# Patient Record
Sex: MALE | Race: WHITE | NOT HISPANIC OR LATINO | URBAN - METROPOLITAN AREA
[De-identification: names, ages, dates, MRNs, and addresses within clinical notes are randomized per-mention and may not be internally consistent; named-entity substitution may affect disease eponyms.]

---

## 2023-08-09 ENCOUNTER — NURSING HOME VISIT (OUTPATIENT)
Dept: POST ACUTE CARE | Facility: EXTERNAL LOCATION | Age: 58
End: 2023-08-09
Payer: COMMERCIAL

## 2023-08-09 DIAGNOSIS — M86.00 ACUTE HEMATOGENOUS OSTEOMYELITIS, UNSPECIFIED SITE (MULTI): ICD-10-CM

## 2023-08-09 DIAGNOSIS — S82.201A CLOSED FRACTURE OF RIGHT TIBIA AND FIBULA, INITIAL ENCOUNTER: Primary | ICD-10-CM

## 2023-08-09 DIAGNOSIS — G47.33 OBSTRUCTIVE SLEEP APNEA: ICD-10-CM

## 2023-08-09 DIAGNOSIS — S82.401A CLOSED FRACTURE OF RIGHT TIBIA AND FIBULA, INITIAL ENCOUNTER: Primary | ICD-10-CM

## 2023-08-09 PROCEDURE — 99305 1ST NF CARE MODERATE MDM 35: CPT | Performed by: INTERNAL MEDICINE

## 2023-08-09 NOTE — LETTER
Patient: John Rojas  : 1965    Encounter Date: 2023    HISTORY & PHYSICAL    Subjective  Chief complaint: John Rojas is a 57 y.o. male who is a acute skilled care patient being seen and evaluated for multiple medical problems.  Patient presents for weakness.    HPI:  Patient was recently admitted to skilled nursing facility. Patient was in the hospital after a fall. Patient has difficulty walking and fell, resulting in a fracture of right tibia. Patient was evaluated by PT and OT and discharged to skilled nursing facility. Patient is going back for surgery next week        Past Medical History:   Diagnosis Date   • Obstructive sleep apnea (adult) (pediatric)     Obstructive sleep apnea   • Pain in unspecified hip     Joint pain, hip   • Pain in unspecified knee     Joint pain, knee       Past Surgical History:   Procedure Laterality Date   • CT HEAD ANGIO W AND WO IV CONTRAST  2020    CT HEAD ANGIO W AND WO IV CONTRAST 2020 [facility] CLINICAL LEGACY   • CT NECK ANGIO W AND WO IV CONTRAST  2020    CT NECK ANGIO W AND WO IV CONTRAST 2020 [facility] CLINICAL LEGACY   • TOTAL HIP ARTHROPLASTY  10/18/2013    Total Hip Replacement       No family history on file.    Social History     Socioeconomic History   • Marital status:      Spouse name: Not on file   • Number of children: Not on file   • Years of education: Not on file   • Highest education level: Not on file   Occupational History   • Not on file   Tobacco Use   • Smoking status: Not on file   • Smokeless tobacco: Not on file   Substance and Sexual Activity   • Alcohol use: Not on file   • Drug use: Not on file   • Sexual activity: Not on file   Other Topics Concern   • Not on file   Social History Narrative   • Not on file     Social Determinants of Health     Financial Resource Strain: Not on file   Food Insecurity: Not on file   Transportation Needs: Not on file   Physical Activity: Not on file   Stress: Not on file    Social Connections: Not on file   Intimate Partner Violence: Not on file   Housing Stability: Not on file       Vital signs:110/70    Objective  Physical Exam  Vitals reviewed.   Constitutional:       Appearance: Normal appearance.   HENT:      Head: Normocephalic and atraumatic.   Cardiovascular:      Rate and Rhythm: Normal rate and regular rhythm.   Pulmonary:      Effort: Pulmonary effort is normal.      Breath sounds: Normal breath sounds.   Abdominal:      General: Bowel sounds are normal.      Palpations: Abdomen is soft.   Musculoskeletal:      Cervical back: Neck supple.      Comments: Right leg in the immobilizer  Left arm from of the elbow to Forearm is in tIn the flexible splint   Skin:     General: Skin is warm and dry.   Neurological:      General: No focal deficit present.      Mental Status: He is alert.   Psychiatric:         Mood and Affect: Mood normal.         Behavior: Behavior is cooperative.         Assessment/Plan  Problem List Items Addressed This Visit       Obstructive sleep apnea    Acute hematogenous osteomyelitis (CMS/HCC)    Closed fracture of right fibula and tibia - Primary     Medications, treatments, and labs reviewed  Continue medications and treatments as listed in [facility]    Scribe Attestation  IGinny Scribe   attest that this documentation has been prepared under the direction and in the presence of Lilia Steve MD.    Provider Attestation - Scribe documentation  All medical record entries made by the Scribe were at my direction and personally dictated by me. I have reviewed the chart and agree that the record accurately reflects my personal performance of the history, physical exam, discussion and plan.    Lilia Steve MD          Electronically Signed By: Lilia Steve MD   8/9/23  6:58 PM

## 2023-08-09 NOTE — PROGRESS NOTES
HISTORY & PHYSICAL    Subjective   Chief complaint: John Rojas is a 57 y.o. male who is a acute skilled care patient being seen and evaluated for multiple medical problems.  Patient presents for weakness.    HPI:  Patient was recently admitted to skilled nursing facility. Patient was in the hospital after a fall. Patient has difficulty walking and fell, resulting in a fracture of right tibia. Patient was evaluated by PT and OT and discharged to skilled nursing facility. Patient is going back for surgery next week        Past Medical History:   Diagnosis Date    Obstructive sleep apnea (adult) (pediatric)     Obstructive sleep apnea    Pain in unspecified hip     Joint pain, hip    Pain in unspecified knee     Joint pain, knee       Past Surgical History:   Procedure Laterality Date    CT HEAD ANGIO W AND WO IV CONTRAST  8/22/2020    CT HEAD ANGIO W AND WO IV CONTRAST 8/22/2020 [facility] CLINICAL LEGACY    CT NECK ANGIO W AND WO IV CONTRAST  8/22/2020    CT NECK ANGIO W AND WO IV CONTRAST 8/22/2020 [facility] CLINICAL LEGACY    TOTAL HIP ARTHROPLASTY  10/18/2013    Total Hip Replacement       No family history on file.    Social History     Socioeconomic History    Marital status:      Spouse name: Not on file    Number of children: Not on file    Years of education: Not on file    Highest education level: Not on file   Occupational History    Not on file   Tobacco Use    Smoking status: Not on file    Smokeless tobacco: Not on file   Substance and Sexual Activity    Alcohol use: Not on file    Drug use: Not on file    Sexual activity: Not on file   Other Topics Concern    Not on file   Social History Narrative    Not on file     Social Determinants of Health     Financial Resource Strain: Not on file   Food Insecurity: Not on file   Transportation Needs: Not on file   Physical Activity: Not on file   Stress: Not on file   Social Connections: Not on file   Intimate Partner Violence: Not on file   Housing Stability:  Not on file       Vital signs:110/70    Objective   Physical Exam  Vitals reviewed.   Constitutional:       Appearance: Normal appearance.   HENT:      Head: Normocephalic and atraumatic.   Cardiovascular:      Rate and Rhythm: Normal rate and regular rhythm.   Pulmonary:      Effort: Pulmonary effort is normal.      Breath sounds: Normal breath sounds.   Abdominal:      General: Bowel sounds are normal.      Palpations: Abdomen is soft.   Musculoskeletal:      Cervical back: Neck supple.      Comments: Right leg in the immobilizer  Left arm from of the elbow to Forearm is in tIn the flexible splint   Skin:     General: Skin is warm and dry.   Neurological:      General: No focal deficit present.      Mental Status: He is alert.   Psychiatric:         Mood and Affect: Mood normal.         Behavior: Behavior is cooperative.         Assessment/Plan   Problem List Items Addressed This Visit       Obstructive sleep apnea    Acute hematogenous osteomyelitis (CMS/HCC)    Closed fracture of right fibula and tibia - Primary     Medications, treatments, and labs reviewed  Continue medications and treatments as listed in [facility]    Scribe Attestation  I, Ginny Arriaga Scribe   attest that this documentation has been prepared under the direction and in the presence of Lilia Steve MD.    Provider Attestation - Scribe documentation  All medical record entries made by the Scribe were at my direction and personally dictated by me. I have reviewed the chart and agree that the record accurately reflects my personal performance of the history, physical exam, discussion and plan.    Lilia Steve MD

## 2023-08-11 ENCOUNTER — NURSING HOME VISIT (OUTPATIENT)
Dept: POST ACUTE CARE | Facility: EXTERNAL LOCATION | Age: 58
End: 2023-08-11
Payer: COMMERCIAL

## 2023-08-11 DIAGNOSIS — R53.1 WEAKNESS: ICD-10-CM

## 2023-08-11 DIAGNOSIS — S82.201D CLOSED FRACTURE OF RIGHT TIBIA AND FIBULA WITH ROUTINE HEALING, SUBSEQUENT ENCOUNTER: Primary | ICD-10-CM

## 2023-08-11 DIAGNOSIS — S82.401D CLOSED FRACTURE OF RIGHT TIBIA AND FIBULA WITH ROUTINE HEALING, SUBSEQUENT ENCOUNTER: Primary | ICD-10-CM

## 2023-08-11 DIAGNOSIS — M86.00 ACUTE HEMATOGENOUS OSTEOMYELITIS, UNSPECIFIED SITE (MULTI): ICD-10-CM

## 2023-08-11 PROCEDURE — 99308 SBSQ NF CARE LOW MDM 20: CPT | Performed by: INTERNAL MEDICINE

## 2023-08-11 NOTE — LETTER
Patient: John Rojas  : 1965    Encounter Date: 2023    PROGRESS NOTE    Subjective  Chief complaint: John Rojas is a 57 y.o. male who is an acute skilled patient being seen and evaluated for weakness    HPI:  HPI  Objective  Vital signs: 126/74, 98%    Physical Exam  Constitutional:       General: He is not in acute distress.  Eyes:      Extraocular Movements: Extraocular movements intact.   Cardiovascular:      Rate and Rhythm: Normal rate and regular rhythm.   Pulmonary:      Effort: Pulmonary effort is normal.      Breath sounds: Normal breath sounds.   Abdominal:      General: Bowel sounds are normal.      Palpations: Abdomen is soft.   Musculoskeletal:      Cervical back: Neck supple.      Right lower leg: No edema.      Left lower leg: No edema.      Comments: Left arm immobilizer  Right knee immobilizer   Neurological:      Mental Status: He is alert.   Psychiatric:         Mood and Affect: Mood normal.         Behavior: Behavior is cooperative.         Assessment/Plan  Problem List Items Addressed This Visit       Acute hematogenous osteomyelitis (CMS/HCC)     Left elbow  Immobilizer in place  Cont current meds         Closed fracture of right fibula and tibia - Primary     Therapy  Follow up with Ortho         Weakness     Cont therapy          Medications, treatments, and labs reviewed  Continue medications and treatments as listed in [facility]    Scribe Attestation  By signing my name below, IRenata Scribperla   attest that this documentation has been prepared under the direction and in the presence of Lilia Steve MD.    Provider Attestation - Scribe documentation  All medical record entries made by the Scribe were at my direction and personally dictated by me. I have reviewed the chart and agree that the record accurately reflects my personal performance of the history, physical exam, discussion and plan.      Electronically Signed By: Lilia Steve MD   23  3:53 PM

## 2023-08-11 NOTE — PROGRESS NOTES
PROGRESS NOTE    Subjective   Chief complaint: John Rojas is a 57 y.o. male who is an acute skilled patient being seen and evaluated for weakness    HPI:  HPI  Objective   Vital signs: 126/74, 98%    Physical Exam  Constitutional:       General: He is not in acute distress.  Eyes:      Extraocular Movements: Extraocular movements intact.   Cardiovascular:      Rate and Rhythm: Normal rate and regular rhythm.   Pulmonary:      Effort: Pulmonary effort is normal.      Breath sounds: Normal breath sounds.   Abdominal:      General: Bowel sounds are normal.      Palpations: Abdomen is soft.   Musculoskeletal:      Cervical back: Neck supple.      Right lower leg: No edema.      Left lower leg: No edema.      Comments: Left arm immobilizer  Right knee immobilizer   Neurological:      Mental Status: He is alert.   Psychiatric:         Mood and Affect: Mood normal.         Behavior: Behavior is cooperative.         Assessment/Plan   Problem List Items Addressed This Visit       Acute hematogenous osteomyelitis (CMS/HCC)     Left elbow  Immobilizer in place  Cont current meds         Closed fracture of right fibula and tibia - Primary     Therapy  Follow up with Ortho         Weakness     Cont therapy          Medications, treatments, and labs reviewed  Continue medications and treatments as listed in [facility]    Scribe Attestation  By signing my name below, I, Irene Salcedoibperla   attest that this documentation has been prepared under the direction and in the presence of Lilia Steve MD.    Provider Attestation - Scribe documentation  All medical record entries made by the Scribe were at my direction and personally dictated by me. I have reviewed the chart and agree that the record accurately reflects my personal performance of the history, physical exam, discussion and plan.

## 2023-08-16 ENCOUNTER — NURSING HOME VISIT (OUTPATIENT)
Dept: POST ACUTE CARE | Facility: EXTERNAL LOCATION | Age: 58
End: 2023-08-16
Payer: COMMERCIAL

## 2023-08-16 DIAGNOSIS — M86.00 ACUTE HEMATOGENOUS OSTEOMYELITIS, UNSPECIFIED SITE (MULTI): ICD-10-CM

## 2023-08-16 DIAGNOSIS — R53.1 WEAKNESS: Primary | ICD-10-CM

## 2023-08-16 DIAGNOSIS — S82.201D CLOSED FRACTURE OF RIGHT TIBIA AND FIBULA WITH ROUTINE HEALING, SUBSEQUENT ENCOUNTER: ICD-10-CM

## 2023-08-16 DIAGNOSIS — S82.401D CLOSED FRACTURE OF RIGHT TIBIA AND FIBULA WITH ROUTINE HEALING, SUBSEQUENT ENCOUNTER: ICD-10-CM

## 2023-08-16 PROCEDURE — 99308 SBSQ NF CARE LOW MDM 20: CPT | Performed by: INTERNAL MEDICINE

## 2023-08-16 NOTE — LETTER
Patient: John Rojas  : 1965    Encounter Date: 2023    PROGRESS NOTE    Subjective  Chief complaint: John Rojas is a 57 y.o. male who is an acute skilled patient being seen and evaluated for weakness    HPI:   patient seen and evaluated for weakness.  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   Patient has a follow-up with Ortho today,   Where he will address his pain which she feels is not controlled.       Objective  Vital signs: 126/74, 98%    Physical Exam  Constitutional:       General: He is not in acute distress.  Eyes:      Extraocular Movements: Extraocular movements intact.   Cardiovascular:      Rate and Rhythm: Normal rate and regular rhythm.   Pulmonary:      Effort: Pulmonary effort is normal.      Breath sounds: Normal breath sounds.   Abdominal:      General: Bowel sounds are normal.      Palpations: Abdomen is soft.   Musculoskeletal:      Cervical back: Neck supple.      Right lower leg: No edema.      Left lower leg: No edema.      Comments: Left arm immobilizer  Right knee immobilizer   Neurological:      Mental Status: He is alert.   Psychiatric:         Mood and Affect: Mood normal.         Behavior: Behavior is cooperative.         Assessment/Plan  Problem List Items Addressed This Visit       Acute hematogenous osteomyelitis (CMS/HCC)     Left elbow  Immobilizer in place  Cont current meds         Closed fracture of right fibula and tibia     Therapy  Follow up with Ortho today         Weakness - Primary     Cont therapy        Medications, treatments, and labs reviewed  Continue medications and treatments as listed in [facility]    Scribe Attestation  By signing my name below, IRenata, Ireneibperla   attest that this documentation has been prepared under the direction and in the presence of Lilia Steve MD.    Provider Attestation - Scribe documentation  All medical record entries made by  the Scribe were at my direction and personally dictated by me. I have reviewed the chart and agree that the record accurately reflects my personal performance of the history, physical exam, discussion and plan.      Electronically Signed By: Lilia Steve MD   8/16/23  1:27 PM

## 2023-08-16 NOTE — PROGRESS NOTES
PROGRESS NOTE    Subjective   Chief complaint: John Rojas is a 57 y.o. male who is an acute skilled patient being seen and evaluated for weakness    HPI:   patient seen and evaluated for weakness.  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   Patient has a follow-up with Ortho today,   Where he will address his pain which she feels is not controlled.       Objective   Vital signs: 126/74, 98%    Physical Exam  Constitutional:       General: He is not in acute distress.  Eyes:      Extraocular Movements: Extraocular movements intact.   Cardiovascular:      Rate and Rhythm: Normal rate and regular rhythm.   Pulmonary:      Effort: Pulmonary effort is normal.      Breath sounds: Normal breath sounds.   Abdominal:      General: Bowel sounds are normal.      Palpations: Abdomen is soft.   Musculoskeletal:      Cervical back: Neck supple.      Right lower leg: No edema.      Left lower leg: No edema.      Comments: Left arm immobilizer  Right knee immobilizer   Neurological:      Mental Status: He is alert.   Psychiatric:         Mood and Affect: Mood normal.         Behavior: Behavior is cooperative.         Assessment/Plan   Problem List Items Addressed This Visit       Acute hematogenous osteomyelitis (CMS/HCC)     Left elbow  Immobilizer in place  Cont current meds         Closed fracture of right fibula and tibia     Therapy  Follow up with Ortho today         Weakness - Primary     Cont therapy        Medications, treatments, and labs reviewed  Continue medications and treatments as listed in [facility]    Scribe Attestation  By signing my name below, I, Trisha Salcedo   attest that this documentation has been prepared under the direction and in the presence of Lilia Steve MD.    Provider Attestation - Scribe documentation  All medical record entries made by the Scribe were at my direction and personally dictated by me. I have  reviewed the chart and agree that the record accurately reflects my personal performance of the history, physical exam, discussion and plan.

## 2023-08-18 ENCOUNTER — NURSING HOME VISIT (OUTPATIENT)
Dept: POST ACUTE CARE | Facility: EXTERNAL LOCATION | Age: 58
End: 2023-08-18

## 2023-08-18 ENCOUNTER — NURSING HOME VISIT (OUTPATIENT)
Dept: POST ACUTE CARE | Facility: EXTERNAL LOCATION | Age: 58
End: 2023-08-18
Payer: COMMERCIAL

## 2023-08-18 DIAGNOSIS — I48.91 ATRIAL FIBRILLATION, UNSPECIFIED TYPE (MULTI): Primary | ICD-10-CM

## 2023-08-18 DIAGNOSIS — S82.201D CLOSED FRACTURE OF RIGHT TIBIA AND FIBULA WITH ROUTINE HEALING, SUBSEQUENT ENCOUNTER: Primary | ICD-10-CM

## 2023-08-18 DIAGNOSIS — R53.1 WEAKNESS: ICD-10-CM

## 2023-08-18 DIAGNOSIS — S82.401D CLOSED FRACTURE OF RIGHT TIBIA AND FIBULA WITH ROUTINE HEALING, SUBSEQUENT ENCOUNTER: Primary | ICD-10-CM

## 2023-08-18 DIAGNOSIS — M86.00 ACUTE HEMATOGENOUS OSTEOMYELITIS, UNSPECIFIED SITE (MULTI): ICD-10-CM

## 2023-08-18 PROCEDURE — 99308 SBSQ NF CARE LOW MDM 20: CPT | Performed by: INTERNAL MEDICINE

## 2023-08-18 NOTE — PROGRESS NOTES
PROGRESS NOTE    Subjective   Chief complaint: John Rojas is a 57 y.o. male who is a long term care patient being seen and evaluated for Afib    HPI:  I was asked to see patient and evaluate current usage of AC, Xarelto. She has hx of Afib and has been taking Xarelto 15mg daily. Denies palpitations and chest pain. No acute distress.       Objective   Vital signs: 133/74, 98%    Physical Exam  Constitutional:       General: He is not in acute distress.     Appearance: He is obese.   Eyes:      Extraocular Movements: Extraocular movements intact.   Cardiovascular:      Rate and Rhythm: Normal rate and regular rhythm.   Pulmonary:      Effort: Pulmonary effort is normal.      Breath sounds: Normal breath sounds.   Abdominal:      General: Bowel sounds are normal.      Palpations: Abdomen is soft.   Musculoskeletal:      Cervical back: Neck supple.      Right lower leg: Edema present.      Left lower leg: Edema present.   Skin:     General: Skin is warm and dry.   Neurological:      Mental Status: He is alert.   Psychiatric:         Mood and Affect: Mood normal.         Behavior: Behavior is cooperative.         Assessment/Plan   Problem List Items Addressed This Visit       A-fib (CMS/HCC) - Primary     Increase Xarelto to 20mg daily per pharmacy reccomendation          Medications, treatments, and labs reviewed  Continue medications and treatments as listed in [facility]    Scribe Attestation  By signing my name below, I, Trisha Salcedo   attest that this documentation has been prepared under the direction and in the presence of Lilia Steve MD.    Provider Attestation - Scribe documentation  All medical record entries made by the Scribe were at my direction and personally dictated by me. I have reviewed the chart and agree that the record accurately reflects my personal performance of the history, physical exam, discussion and plan.    1. Atrial fibrillation, unspecified type (CMS/HCC)

## 2023-08-18 NOTE — PROGRESS NOTES
PROGRESS NOTE    Subjective   Chief complaint: Jhon Rojas is a 57 y.o. male who is an acute skilled patient being seen and evaluated for weakness    HPI:   patient seen and evaluated for weakness.  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  Patient had a follow-up with Ortho this week and stabilizer was removed from his knee.       Objective   Vital signs: 126/74, 98%    Physical Exam  Constitutional:       General: He is not in acute distress.  Eyes:      Extraocular Movements: Extraocular movements intact.   Cardiovascular:      Rate and Rhythm: Normal rate and regular rhythm.   Pulmonary:      Effort: Pulmonary effort is normal.      Breath sounds: Normal breath sounds.   Abdominal:      General: Bowel sounds are normal.      Palpations: Abdomen is soft.   Musculoskeletal:      Cervical back: Neck supple.      Right lower leg: No edema.      Left lower leg: No edema.      Comments: Left arm immobilizer  Right knee immobilizer- discontinued   Neurological:      Mental Status: He is alert.   Psychiatric:         Mood and Affect: Mood normal.         Behavior: Behavior is cooperative.         Assessment/Plan   Problem List Items Addressed This Visit    None  Medications, treatments, and labs reviewed  Continue medications and treatments as listed in [facility]    Scribe Attestation  By signing my name below, IRenata Scribe   attest that this documentation has been prepared under the direction and in the presence of Lilia Steve MD.    Provider Attestation - Scribe documentation  All medical record entries made by the Scribe were at my direction and personally dictated by me. I have reviewed the chart and agree that the record accurately reflects my personal performance of the history, physical exam, discussion and plan.  1. Closed fracture of right tibia and fibula with routine healing, subsequent encounter        2. Acute  hematogenous osteomyelitis, unspecified site (CMS/HCC)        3. Weakness

## 2023-08-18 NOTE — LETTER
Patient: John Rojas  : 1965    Encounter Date: 2023    PROGRESS NOTE    Subjective  Chief complaint: John Rojas is a 57 y.o. male who is an acute skilled patient being seen and evaluated for weakness    HPI:   patient seen and evaluated for weakness.  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  Patient had a follow-up with Ortho this week and stabilizer was removed from his knee.       Objective  Vital signs: 126/74, 98%    Physical Exam  Constitutional:       General: He is not in acute distress.  Eyes:      Extraocular Movements: Extraocular movements intact.   Cardiovascular:      Rate and Rhythm: Normal rate and regular rhythm.   Pulmonary:      Effort: Pulmonary effort is normal.      Breath sounds: Normal breath sounds.   Abdominal:      General: Bowel sounds are normal.      Palpations: Abdomen is soft.   Musculoskeletal:      Cervical back: Neck supple.      Right lower leg: No edema.      Left lower leg: No edema.      Comments: Left arm immobilizer  Right knee immobilizer- discontinued   Neurological:      Mental Status: He is alert.   Psychiatric:         Mood and Affect: Mood normal.         Behavior: Behavior is cooperative.         Assessment/Plan  Problem List Items Addressed This Visit    None  Medications, treatments, and labs reviewed  Continue medications and treatments as listed in [facility]    Scribe Attestation  By signing my name below, I, Trisha Salcedo   attest that this documentation has been prepared under the direction and in the presence of Lilia Steve MD.    Provider Attestation - Scribe documentation  All medical record entries made by the Scribe were at my direction and personally dictated by me. I have reviewed the chart and agree that the record accurately reflects my personal performance of the history, physical exam, discussion and plan.  1. Closed fracture of right tibia and fibula  with routine healing, subsequent encounter        2. Acute hematogenous osteomyelitis, unspecified site (CMS/HCC)        3. Weakness              Electronically Signed By: Lilia Steve MD   8/19/23  2:32 PM

## 2023-08-18 NOTE — LETTER
Patient: John Rojas  : 1965    Encounter Date: 2023    PROGRESS NOTE    Subjective  Chief complaint: John Rojas is a 57 y.o. male who is a long term care patient being seen and evaluated for Afib    HPI:  I was asked to see patient and evaluate current usage of AC, Xarelto. She has hx of Afib and has been taking Xarelto 15mg daily. Denies palpitations and chest pain. No acute distress.       Objective  Vital signs: 133/74, 98%    Physical Exam  Constitutional:       General: He is not in acute distress.     Appearance: He is obese.   Eyes:      Extraocular Movements: Extraocular movements intact.   Cardiovascular:      Rate and Rhythm: Normal rate and regular rhythm.   Pulmonary:      Effort: Pulmonary effort is normal.      Breath sounds: Normal breath sounds.   Abdominal:      General: Bowel sounds are normal.      Palpations: Abdomen is soft.   Musculoskeletal:      Cervical back: Neck supple.      Right lower leg: Edema present.      Left lower leg: Edema present.   Skin:     General: Skin is warm and dry.   Neurological:      Mental Status: He is alert.   Psychiatric:         Mood and Affect: Mood normal.         Behavior: Behavior is cooperative.         Assessment/Plan  Problem List Items Addressed This Visit       A-fib (CMS/[facility]) - Primary     Increase Xarelto to 20mg daily per pharmacy reccomendation          Medications, treatments, and labs reviewed  Continue medications and treatments as listed in [facility]    Scribe Attestation  By signing my name below, IRenata Scribe   attest that this documentation has been prepared under the direction and in the presence of Lilia Steve MD.    Provider Attestation - Scribe documentation  All medical record entries made by the Scribe were at my direction and personally dictated by me. I have reviewed the chart and agree that the record accurately reflects my personal performance of the history, physical exam, discussion and plan.    1.  Atrial fibrillation, unspecified type (CMS/HCC)               Electronically Signed By: Lilia Steve MD   8/19/23  2:36 PM

## 2023-08-23 ENCOUNTER — NURSING HOME VISIT (OUTPATIENT)
Dept: POST ACUTE CARE | Facility: EXTERNAL LOCATION | Age: 58
End: 2023-08-23
Payer: COMMERCIAL

## 2023-08-23 DIAGNOSIS — S82.401D CLOSED FRACTURE OF RIGHT TIBIA AND FIBULA WITH ROUTINE HEALING, SUBSEQUENT ENCOUNTER: Primary | ICD-10-CM

## 2023-08-23 DIAGNOSIS — S82.201D CLOSED FRACTURE OF RIGHT TIBIA AND FIBULA WITH ROUTINE HEALING, SUBSEQUENT ENCOUNTER: Primary | ICD-10-CM

## 2023-08-23 DIAGNOSIS — R53.1 WEAKNESS: ICD-10-CM

## 2023-08-23 PROCEDURE — 99308 SBSQ NF CARE LOW MDM 20: CPT | Performed by: INTERNAL MEDICINE

## 2023-08-23 NOTE — LETTER
Patient: John Rojas  : 1965    Encounter Date: 2023    PROGRESS NOTE    Subjective  Chief complaint: John Rojas is a 57 y.o. male who is an acute skilled patient being seen and evaluated for weakness    HPI:  Patient working in therapy due to weakness and debility. Requires assistance for transfers, ADL's and mobility. Pain from recent fracture controlled. No acute distress.       Objective  Vital signs: 124/76, 98%    Physical Exam  Constitutional:       General: He is not in acute distress.  Eyes:      Extraocular Movements: Extraocular movements intact.   Cardiovascular:      Rate and Rhythm: Normal rate and regular rhythm.   Pulmonary:      Effort: Pulmonary effort is normal.      Breath sounds: Normal breath sounds.   Abdominal:      General: Bowel sounds are normal.      Palpations: Abdomen is soft.   Musculoskeletal:      Cervical back: Neck supple.      Right lower leg: No edema.      Left lower leg: No edema.   Neurological:      Mental Status: He is alert.   Psychiatric:         Mood and Affect: Mood normal.         Behavior: Behavior is cooperative.         Assessment/Plan  Problem List Items Addressed This Visit       Closed fracture of right fibula and tibia - Primary     Stabilizer removed  Continue therapy           Weakness     Cont therapy          Medications, treatments, and labs reviewed  Continue medications and treatments as listed in [facility]    Scribe Attestation  By signing my name below, I, Irene Salcedoibperla   attest that this documentation has been prepared under the direction and in the presence of Lilia Steve MD.    Provider Attestation - Scribe documentation  All medical record entries made by the Scribe were at my direction and personally dictated by me. I have reviewed the chart and agree that the record accurately reflects my personal performance of the history, physical exam, discussion and plan.      Electronically Signed By: Lilia Steve MD   23   6:17 PM

## 2023-08-25 ENCOUNTER — NURSING HOME VISIT (OUTPATIENT)
Dept: POST ACUTE CARE | Facility: EXTERNAL LOCATION | Age: 58
End: 2023-08-25
Payer: COMMERCIAL

## 2023-08-25 DIAGNOSIS — S82.401D CLOSED FRACTURE OF RIGHT TIBIA AND FIBULA WITH ROUTINE HEALING, SUBSEQUENT ENCOUNTER: Primary | ICD-10-CM

## 2023-08-25 DIAGNOSIS — R53.1 WEAKNESS: ICD-10-CM

## 2023-08-25 DIAGNOSIS — I48.91 ATRIAL FIBRILLATION, UNSPECIFIED TYPE (MULTI): ICD-10-CM

## 2023-08-25 DIAGNOSIS — S82.201D CLOSED FRACTURE OF RIGHT TIBIA AND FIBULA WITH ROUTINE HEALING, SUBSEQUENT ENCOUNTER: Primary | ICD-10-CM

## 2023-08-25 PROCEDURE — 99308 SBSQ NF CARE LOW MDM 20: CPT | Performed by: INTERNAL MEDICINE

## 2023-08-25 NOTE — LETTER
Patient: John Rojas  : 1965    Encounter Date: 2023    PROGRESS NOTE    Subjective  Chief complaint: John Rojas is a 57 y.o. male who is a long term care patient being seen and evaluated for Afib    HPI:  Patient continues to work in therapy due to weakness and debility. Requires assistance with transfers, ADL's and mobility. Denies palpitations and chest pain. No acute distress. Pain from recent fracture controlled.       Objective  Vital signs: 129/72,  98%    Physical Exam  Constitutional:       General: He is not in acute distress.     Appearance: He is obese.   Eyes:      Extraocular Movements: Extraocular movements intact.   Cardiovascular:      Rate and Rhythm: Normal rate and regular rhythm.   Pulmonary:      Effort: Pulmonary effort is normal.      Breath sounds: Normal breath sounds.   Abdominal:      General: Bowel sounds are normal.      Palpations: Abdomen is soft.   Musculoskeletal:      Cervical back: Neck supple.      Right lower leg: Edema present.      Left lower leg: Edema present.   Skin:     General: Skin is warm and dry.   Neurological:      Mental Status: He is alert.   Psychiatric:         Mood and Affect: Mood normal.         Behavior: Behavior is cooperative.         Assessment/Plan  Problem List Items Addressed This Visit       Closed fracture of right fibula and tibia - Primary     Stabilizer removed  Continue therapy           Weakness     Cont therapy         A-fib (CMS/[facility])     Continue Xarelto   Monitor for bleeding         Medications, treatments, and labs reviewed  Continue medications and treatments as listed in [facility]    Scribe Attestation  By signing my name below, Renata STEWART Scribe   attest that this documentation has been prepared under the direction and in the presence of Liila Steve MD.    Provider Attestation - Scribe documentation  All medical record entries made by the Scribe were at my direction and personally dictated by me. I have  reviewed the chart and agree that the record accurately reflects my personal performance of the history, physical exam, discussion and plan.    1. Closed fracture of right tibia and fibula with routine healing, subsequent encounter        2. Weakness        3. Atrial fibrillation, unspecified type (CMS/[facility])                 Electronically Signed By: Lilia Steve MD   8/25/23  6:03 PM

## 2023-08-25 NOTE — PROGRESS NOTES
PROGRESS NOTE    Subjective   Chief complaint: John Rojas is a 57 y.o. male who is a long term care patient being seen and evaluated for Afib    HPI:  Patient continues to work in therapy due to weakness and debility. Requires assistance with transfers, ADL's and mobility. Denies palpitations and chest pain. No acute distress. Pain from recent fracture controlled.       Objective   Vital signs: 129/72,  98%    Physical Exam  Constitutional:       General: He is not in acute distress.     Appearance: He is obese.   Eyes:      Extraocular Movements: Extraocular movements intact.   Cardiovascular:      Rate and Rhythm: Normal rate and regular rhythm.   Pulmonary:      Effort: Pulmonary effort is normal.      Breath sounds: Normal breath sounds.   Abdominal:      General: Bowel sounds are normal.      Palpations: Abdomen is soft.   Musculoskeletal:      Cervical back: Neck supple.      Right lower leg: Edema present.      Left lower leg: Edema present.   Skin:     General: Skin is warm and dry.   Neurological:      Mental Status: He is alert.   Psychiatric:         Mood and Affect: Mood normal.         Behavior: Behavior is cooperative.         Assessment/Plan   Problem List Items Addressed This Visit       Closed fracture of right fibula and tibia - Primary     Stabilizer removed  Continue therapy           Weakness     Cont therapy         A-fib (CMS/[facility])     Continue Xarelto   Monitor for bleeding         Medications, treatments, and labs reviewed  Continue medications and treatments as listed in [facility]    Scribe Attestation  By signing my name below, I, Trisha Salcedo   attest that this documentation has been prepared under the direction and in the presence of Lilia Steve MD.    Provider Attestation - Scribe documentation  All medical record entries made by the Scribe were at my direction and personally dictated by me. I have reviewed the chart and agree that the record accurately reflects my personal  performance of the history, physical exam, discussion and plan.    1. Closed fracture of right tibia and fibula with routine healing, subsequent encounter        2. Weakness        3. Atrial fibrillation, unspecified type (CMS/HCC)

## 2023-08-28 ENCOUNTER — NURSING HOME VISIT (OUTPATIENT)
Dept: POST ACUTE CARE | Facility: EXTERNAL LOCATION | Age: 58
End: 2023-08-28
Payer: COMMERCIAL

## 2023-08-28 DIAGNOSIS — R42 DIZZINESS: ICD-10-CM

## 2023-08-28 DIAGNOSIS — I48.91 ATRIAL FIBRILLATION, UNSPECIFIED TYPE (MULTI): Primary | ICD-10-CM

## 2023-08-28 DIAGNOSIS — R53.1 WEAKNESS: ICD-10-CM

## 2023-08-28 PROCEDURE — 99309 SBSQ NF CARE MODERATE MDM 30: CPT

## 2023-08-28 NOTE — LETTER
Patient: John Rojas  : 1965    Encounter Date: 2023    PROGRESS NOTE    Subjective  Chief complaint: John Rojas is a 57 y.o. male who is an acute skilled patient being seen and evaluated for weakness    HPI:  Patient seen and evaluated for general medical care and routine follow-up.  Patient continues to work with therapy for stretching, weightbearing, strengthening and balance.  Continues to use brace on left upper extremity.  Patient has scheduled L UE amputation D/T osteomyelitis on 10/19.  Patient had a recent fall D/T dizziness.  Patient also states being unable to bear weight in PT D/T right knee popping  With resultant weakness.  No other concerns per nursing          Objective  Vital signs:  111/89-68-18    Physical Exam  Constitutional:       Appearance: Normal appearance.   HENT:      Head: Normocephalic.      Mouth/Throat:      Mouth: Mucous membranes are moist.   Eyes:      Extraocular Movements: Extraocular movements intact.   Cardiovascular:      Rate and Rhythm: Normal rate. Rhythm irregular.      Pulses: Normal pulses.      Heart sounds: Normal heart sounds.   Pulmonary:      Effort: Pulmonary effort is normal.      Breath sounds: Normal breath sounds.   Abdominal:      General: Bowel sounds are normal.      Palpations: Abdomen is soft.   Musculoskeletal:         General: Normal range of motion.      Cervical back: Normal range of motion and neck supple.      Comments:  right knee- unable to bear weight S/P popping   no redness, warmth, tenderness, swelling    MSPs intact   L UE brace   Skin:     General: Skin is warm and dry.      Capillary Refill: Capillary refill takes less than 2 seconds.   Neurological:      Mental Status: He is alert. Mental status is at baseline.   Psychiatric:         Mood and Affect: Mood normal.         Behavior: Behavior normal.         Assessment/Plan  Problem List Items Addressed This Visit       Weakness      therapy         A-fib (CMS/[facility]) -  Primary      Stable   no SOB, palpitations, chest pain  (+) dizziness   Xarelto   bleeding precautions         Dizziness     ongoing history of vertigo   Meclizine   reinforced calling for assistance prior to getting up          Medications, treatments, and labs reviewed  Continue medications and treatments as listed in EMR    SIMON La        Electronically Signed By: SIMON La   9/9/23  5:27 PM

## 2023-08-29 NOTE — PROGRESS NOTES
PROGRESS NOTE    Subjective   Chief complaint: John Rojas is a 57 y.o. male who is an acute skilled patient being seen and evaluated for weakness    HPI:  Patient working in therapy due to weakness and debility. Requires assistance for transfers, ADL's and mobility. Pain from recent fracture controlled. No acute distress.       Objective   Vital signs: 124/76, 98%    Physical Exam  Constitutional:       General: He is not in acute distress.  Eyes:      Extraocular Movements: Extraocular movements intact.   Cardiovascular:      Rate and Rhythm: Normal rate and regular rhythm.   Pulmonary:      Effort: Pulmonary effort is normal.      Breath sounds: Normal breath sounds.   Abdominal:      General: Bowel sounds are normal.      Palpations: Abdomen is soft.   Musculoskeletal:      Cervical back: Neck supple.      Right lower leg: No edema.      Left lower leg: No edema.   Neurological:      Mental Status: He is alert.   Psychiatric:         Mood and Affect: Mood normal.         Behavior: Behavior is cooperative.         Assessment/Plan   Problem List Items Addressed This Visit       Closed fracture of right fibula and tibia - Primary     Stabilizer removed  Continue therapy           Weakness     Cont therapy          Medications, treatments, and labs reviewed  Continue medications and treatments as listed in [facility]    Scribe Attestation  By signing my name below, IRenata Scribe   attest that this documentation has been prepared under the direction and in the presence of Lilia Steve MD.    Provider Attestation - Scribe documentation  All medical record entries made by the Scribe were at my direction and personally dictated by me. I have reviewed the chart and agree that the record accurately reflects my personal performance of the history, physical exam, discussion and plan.

## 2023-08-30 ENCOUNTER — NURSING HOME VISIT (OUTPATIENT)
Dept: POST ACUTE CARE | Facility: EXTERNAL LOCATION | Age: 58
End: 2023-08-30
Payer: COMMERCIAL

## 2023-08-30 DIAGNOSIS — R53.1 WEAKNESS: Primary | ICD-10-CM

## 2023-08-30 DIAGNOSIS — S82.401D CLOSED FRACTURE OF RIGHT TIBIA AND FIBULA WITH ROUTINE HEALING, SUBSEQUENT ENCOUNTER: ICD-10-CM

## 2023-08-30 DIAGNOSIS — S82.201D CLOSED FRACTURE OF RIGHT TIBIA AND FIBULA WITH ROUTINE HEALING, SUBSEQUENT ENCOUNTER: ICD-10-CM

## 2023-08-30 PROCEDURE — 99308 SBSQ NF CARE LOW MDM 20: CPT | Performed by: INTERNAL MEDICINE

## 2023-08-30 NOTE — PROGRESS NOTES
PROGRESS NOTE    Subjective   Chief complaint: John Rojas is a 57 y.o. male who is an acute skilled patient being seen and evaluated for weakness    HPI:  Patient working in therapy due to weakness and debility. Requires assistance for transfers, ADL's and mobility. Pain from recent fracture controlled. No acute distress.       Objective   Vital signs: 126/78, 98%    Physical Exam  Constitutional:       General: He is not in acute distress.  Eyes:      Extraocular Movements: Extraocular movements intact.   Cardiovascular:      Rate and Rhythm: Normal rate and regular rhythm.   Pulmonary:      Effort: Pulmonary effort is normal.      Breath sounds: Normal breath sounds.   Abdominal:      General: Bowel sounds are normal.      Palpations: Abdomen is soft.   Musculoskeletal:      Cervical back: Neck supple.      Right lower leg: No edema.      Left lower leg: No edema.   Neurological:      Mental Status: He is alert.   Psychiatric:         Mood and Affect: Mood normal.         Behavior: Behavior is cooperative.         Assessment/Plan   Problem List Items Addressed This Visit    None  Medications, treatments, and labs reviewed  Continue medications and treatments as listed in [facility]    Scribe Attestation  By signing my name below, I, Irene Salcedoibperla   attest that this documentation has been prepared under the direction and in the presence of Lilia Steve MD.    Provider Attestation - Scribe documentation  All medical record entries made by the Scribe were at my direction and personally dictated by me. I have reviewed the chart and agree that the record accurately reflects my personal performance of the history, physical exam, discussion and plan.  1. Weakness        2. Closed fracture of right tibia and fibula with routine healing, subsequent encounter

## 2023-09-01 ENCOUNTER — NURSING HOME VISIT (OUTPATIENT)
Dept: POST ACUTE CARE | Facility: EXTERNAL LOCATION | Age: 58
End: 2023-09-01
Payer: MEDICAID

## 2023-09-01 DIAGNOSIS — S82.401D CLOSED FRACTURE OF RIGHT TIBIA AND FIBULA WITH ROUTINE HEALING, SUBSEQUENT ENCOUNTER: Primary | ICD-10-CM

## 2023-09-01 DIAGNOSIS — R53.1 WEAKNESS: ICD-10-CM

## 2023-09-01 DIAGNOSIS — S82.201D CLOSED FRACTURE OF RIGHT TIBIA AND FIBULA WITH ROUTINE HEALING, SUBSEQUENT ENCOUNTER: Primary | ICD-10-CM

## 2023-09-01 PROCEDURE — 99308 SBSQ NF CARE LOW MDM 20: CPT | Performed by: INTERNAL MEDICINE

## 2023-09-01 NOTE — LETTER
Patient: John Rojsa  : 1965    Encounter Date: 2023    PROGRESS NOTE    Subjective  Chief complaint: John Rojas is a 57 y.o. male who is an acute skilled patient being seen and evaluated for weakness    HPI:  Patient working in therapy due to weakness and debility. Requires assistance for transfers, ADL's and mobility. Pain from recent fracture controlled. No acute distress.       Objective  Vital signs: 126/78, 98%    Physical Exam  Constitutional:       General: He is not in acute distress.  Eyes:      Extraocular Movements: Extraocular movements intact.   Cardiovascular:      Rate and Rhythm: Normal rate and regular rhythm.   Pulmonary:      Effort: Pulmonary effort is normal.      Breath sounds: Normal breath sounds.   Abdominal:      General: Bowel sounds are normal.      Palpations: Abdomen is soft.   Musculoskeletal:      Cervical back: Neck supple.      Right lower leg: No edema.      Left lower leg: No edema.   Neurological:      Mental Status: He is alert.   Psychiatric:         Mood and Affect: Mood normal.         Behavior: Behavior is cooperative.         Assessment/Plan  Problem List Items Addressed This Visit       Closed fracture of right fibula and tibia - Primary     Stabilizer removed  Continue therapy           Weakness     Cont therapy        Medications, treatments, and labs reviewed  Continue medications and treatments as listed in [facility]    Scribe Attestation  By signing my name below, I, Trisha Salcedo   attest that this documentation has been prepared under the direction and in the presence of Lilia Steve MD.    Provider Attestation - Scribe documentation  All medical record entries made by the Scribe were at my direction and personally dictated by me. I have reviewed the chart and agree that the record accurately reflects my personal performance of the history, physical exam, discussion and plan.  1. Closed fracture of right tibia and fibula with routine  healing, subsequent encounter        2. Weakness              Electronically Signed By: Lliia Steve MD   9/4/23  6:12 PM

## 2023-09-04 NOTE — PROGRESS NOTES
PROGRESS NOTE    Subjective   Chief complaint: John Rojas is a 57 y.o. male who is an acute skilled patient being seen and evaluated for weakness    HPI:  Patient working in therapy due to weakness and debility. Requires assistance for transfers, ADL's and mobility. Pain from recent fracture controlled. No acute distress.       Objective   Vital signs: 126/78, 98%    Physical Exam  Constitutional:       General: He is not in acute distress.  Eyes:      Extraocular Movements: Extraocular movements intact.   Cardiovascular:      Rate and Rhythm: Normal rate and regular rhythm.   Pulmonary:      Effort: Pulmonary effort is normal.      Breath sounds: Normal breath sounds.   Abdominal:      General: Bowel sounds are normal.      Palpations: Abdomen is soft.   Musculoskeletal:      Cervical back: Neck supple.      Right lower leg: No edema.      Left lower leg: No edema.   Neurological:      Mental Status: He is alert.   Psychiatric:         Mood and Affect: Mood normal.         Behavior: Behavior is cooperative.         Assessment/Plan   Problem List Items Addressed This Visit       Closed fracture of right fibula and tibia - Primary     Stabilizer removed  Continue therapy           Weakness     Cont therapy        Medications, treatments, and labs reviewed  Continue medications and treatments as listed in [facility]    Scribe Attestation  By signing my name below, IRenata Scribe   attest that this documentation has been prepared under the direction and in the presence of Lilia Steve MD.    Provider Attestation - Scribe documentation  All medical record entries made by the Scribe were at my direction and personally dictated by me. I have reviewed the chart and agree that the record accurately reflects my personal performance of the history, physical exam, discussion and plan.  1. Closed fracture of right tibia and fibula with routine healing, subsequent encounter        2. Weakness

## 2023-09-06 ENCOUNTER — NURSING HOME VISIT (OUTPATIENT)
Dept: POST ACUTE CARE | Facility: EXTERNAL LOCATION | Age: 58
End: 2023-09-06
Payer: MEDICAID

## 2023-09-06 DIAGNOSIS — S82.201D CLOSED FRACTURE OF RIGHT TIBIA AND FIBULA WITH ROUTINE HEALING, SUBSEQUENT ENCOUNTER: Primary | ICD-10-CM

## 2023-09-06 DIAGNOSIS — R53.1 WEAKNESS: ICD-10-CM

## 2023-09-06 DIAGNOSIS — S82.401D CLOSED FRACTURE OF RIGHT TIBIA AND FIBULA WITH ROUTINE HEALING, SUBSEQUENT ENCOUNTER: Primary | ICD-10-CM

## 2023-09-06 DIAGNOSIS — I48.91 ATRIAL FIBRILLATION, UNSPECIFIED TYPE (MULTI): ICD-10-CM

## 2023-09-06 PROCEDURE — 99309 SBSQ NF CARE MODERATE MDM 30: CPT | Performed by: INTERNAL MEDICINE

## 2023-09-06 NOTE — LETTER
Patient: John Rojas  : 1965    Encounter Date: 2023    PROGRESS NOTE    Subjective  Chief complaint: John Rojas is a 57 y.o. male who is a long term care patient being seen and evaluated for monthly general medical care and follow-up.    HPI:   patient presents for general medical care and f/u.  Patient seen and examined at bedside.  No issues per nursing.  Patient has no acute complaints.    AFIB stable, denies palpitations and chest pain.  Pain from recent fracture is controlled with current medications.  Patient continues to work in therapy.  Requires  minimal assistance for transfers and ADLs.  No acute distress.      Objective  Vital signs:   128/67, 98%     Physical Exam  Constitutional:       General: He is not in acute distress.  Eyes:      Extraocular Movements: Extraocular movements intact.   Cardiovascular:      Rate and Rhythm: Normal rate and regular rhythm.   Pulmonary:      Effort: Pulmonary effort is normal.      Breath sounds: Normal breath sounds.   Abdominal:      General: Bowel sounds are normal.      Palpations: Abdomen is soft.   Musculoskeletal:      Cervical back: Neck supple.      Right lower leg: No edema.      Left lower leg: No edema.   Neurological:      Mental Status: He is alert.   Psychiatric:         Mood and Affect: Mood normal.         Behavior: Behavior is cooperative.         Assessment/Plan  Problem List Items Addressed This Visit       Closed fracture of right fibula and tibia - Primary    Weakness    A-fib (CMS/HCC)     Medications, treatments, and labs reviewed  Continue medications and treatments as listed in [facility]    Scribe Attestation  By signing my name below, IRenata Scribe   attest that this documentation has been prepared under the direction and in the presence of Lilia Steve MD.    Provider Attestation - Scribe documentation  All medical record entries made by the Scribe were at my direction and personally dictated by me. I have  reviewed the chart and agree that the record accurately reflects my personal performance of the history, physical exam, discussion and plan.    1. Closed fracture of right tibia and fibula with routine healing, subsequent encounter        2. Atrial fibrillation, unspecified type (CMS/HCC)        3. Weakness               Electronically Signed By: Lilia Steve MD   9/7/23  3:23 PM

## 2023-09-07 NOTE — PROGRESS NOTES
PROGRESS NOTE    Subjective   Chief complaint: John Rojas is a 57 y.o. male who is a long term care patient being seen and evaluated for monthly general medical care and follow-up.    HPI:   patient presents for general medical care and f/u.  Patient seen and examined at bedside.  No issues per nursing.  Patient has no acute complaints.    AFIB stable, denies palpitations and chest pain.  Pain from recent fracture is controlled with current medications.  Patient continues to work in therapy.  Requires  minimal assistance for transfers and ADLs.  No acute distress.      Objective   Vital signs:   128/67, 98%     Physical Exam  Constitutional:       General: He is not in acute distress.  Eyes:      Extraocular Movements: Extraocular movements intact.   Cardiovascular:      Rate and Rhythm: Normal rate and regular rhythm.   Pulmonary:      Effort: Pulmonary effort is normal.      Breath sounds: Normal breath sounds.   Abdominal:      General: Bowel sounds are normal.      Palpations: Abdomen is soft.   Musculoskeletal:      Cervical back: Neck supple.      Right lower leg: No edema.      Left lower leg: No edema.   Neurological:      Mental Status: He is alert.   Psychiatric:         Mood and Affect: Mood normal.         Behavior: Behavior is cooperative.         Assessment/Plan   Problem List Items Addressed This Visit       Closed fracture of right fibula and tibia - Primary    Weakness    A-fib (CMS/HCC)     Medications, treatments, and labs reviewed  Continue medications and treatments as listed in [facility]    Scribe Attestation  By signing my name below, I, Trisha Salcedo   attest that this documentation has been prepared under the direction and in the presence of Lilia Steve MD.    Provider Attestation - Scribe documentation  All medical record entries made by the Scribe were at my direction and personally dictated by me. I have reviewed the chart and agree that the record accurately reflects my  personal performance of the history, physical exam, discussion and plan.    1. Closed fracture of right tibia and fibula with routine healing, subsequent encounter        2. Atrial fibrillation, unspecified type (CMS/HCC)        3. Weakness

## 2023-09-08 ENCOUNTER — NURSING HOME VISIT (OUTPATIENT)
Dept: POST ACUTE CARE | Facility: EXTERNAL LOCATION | Age: 58
End: 2023-09-08
Payer: MEDICAID

## 2023-09-08 DIAGNOSIS — S82.201D CLOSED FRACTURE OF RIGHT TIBIA AND FIBULA WITH ROUTINE HEALING, SUBSEQUENT ENCOUNTER: ICD-10-CM

## 2023-09-08 DIAGNOSIS — R53.1 WEAKNESS: Primary | ICD-10-CM

## 2023-09-08 DIAGNOSIS — S82.401D CLOSED FRACTURE OF RIGHT TIBIA AND FIBULA WITH ROUTINE HEALING, SUBSEQUENT ENCOUNTER: ICD-10-CM

## 2023-09-08 PROCEDURE — 99308 SBSQ NF CARE LOW MDM 20: CPT | Performed by: INTERNAL MEDICINE

## 2023-09-08 NOTE — LETTER
Patient: John Rojas  : 1965    Encounter Date: 2023    PROGRESS NOTE    Subjective  Chief complaint: John Rojas is a 57 y.o. male who is an acute skilled patient being seen and evaluated for weakness    HPI:  Patient working in therapy due to weakness and debility. Requires assistance for transfers, ADL's and mobility. Pain from recent fracture controlled. No acute distress.       Objective  Vital signs: 126/78, 98%    Physical Exam  Constitutional:       General: He is not in acute distress.  Eyes:      Extraocular Movements: Extraocular movements intact.   Cardiovascular:      Rate and Rhythm: Normal rate and regular rhythm.   Pulmonary:      Effort: Pulmonary effort is normal.      Breath sounds: Normal breath sounds.   Abdominal:      General: Bowel sounds are normal.      Palpations: Abdomen is soft.   Musculoskeletal:      Cervical back: Neck supple.      Right lower leg: No edema.      Left lower leg: No edema.   Neurological:      Mental Status: He is alert.   Psychiatric:         Mood and Affect: Mood normal.         Behavior: Behavior is cooperative.         Assessment/Plan  Problem List Items Addressed This Visit       Closed fracture of right fibula and tibia     Stabilizer removed  Continue therapy           Weakness - Primary     Cont therapy        Medications, treatments, and labs reviewed  Continue medications and treatments as listed in [facility]    Scribe Attestation  By signing my name below, I, Trisha Salcedo   attest that this documentation has been prepared under the direction and in the presence of Lilia Steve MD.    Provider Attestation - Scribe documentation  All medical record entries made by the Scribe were at my direction and personally dictated by me. I have reviewed the chart and agree that the record accurately reflects my personal performance of the history, physical exam, discussion and plan.  1. Weakness        2. Closed fracture of right tibia and  fibula with routine healing, subsequent encounter              Electronically Signed By: Lilia Steve MD   9/8/23  6:28 PM

## 2023-09-09 PROBLEM — R42 DIZZINESS: Status: ACTIVE | Noted: 2023-09-09

## 2023-09-09 NOTE — PROGRESS NOTES
PROGRESS NOTE    Subjective   Chief complaint: John Rojas is a 57 y.o. male who is an acute skilled patient being seen and evaluated for weakness    HPI:  Patient seen and evaluated for general medical care and routine follow-up.  Patient continues to work with therapy for stretching, weightbearing, strengthening and balance.  Continues to use brace on left upper extremity.  Patient has scheduled L UE amputation D/T osteomyelitis on 10/19.  Patient had a recent fall D/T dizziness.  Patient also states being unable to bear weight in PT D/T right knee popping  With resultant weakness.  No other concerns per nursing          Objective   Vital signs:  111/89-68-18    Physical Exam  Constitutional:       Appearance: Normal appearance.   HENT:      Head: Normocephalic.      Mouth/Throat:      Mouth: Mucous membranes are moist.   Eyes:      Extraocular Movements: Extraocular movements intact.   Cardiovascular:      Rate and Rhythm: Normal rate. Rhythm irregular.      Pulses: Normal pulses.      Heart sounds: Normal heart sounds.   Pulmonary:      Effort: Pulmonary effort is normal.      Breath sounds: Normal breath sounds.   Abdominal:      General: Bowel sounds are normal.      Palpations: Abdomen is soft.   Musculoskeletal:         General: Normal range of motion.      Cervical back: Normal range of motion and neck supple.      Comments:  right knee- unable to bear weight S/P popping   no redness, warmth, tenderness, swelling    MSPs intact   L UE brace   Skin:     General: Skin is warm and dry.      Capillary Refill: Capillary refill takes less than 2 seconds.   Neurological:      Mental Status: He is alert. Mental status is at baseline.   Psychiatric:         Mood and Affect: Mood normal.         Behavior: Behavior normal.         Assessment/Plan   Problem List Items Addressed This Visit       Weakness      therapy         A-fib (CMS/[facility]) - Primary      Stable   no SOB, palpitations, chest pain  (+) dizziness    Xarelto   bleeding precautions         Dizziness     ongoing history of vertigo   Meclizine   reinforced calling for assistance prior to getting up          Medications, treatments, and labs reviewed  Continue medications and treatments as listed in EMR    Larissa Laguerre, APRN-CNP

## 2023-09-09 NOTE — ASSESSMENT & PLAN NOTE
Stable   no SOB, palpitations, chest pain  (+) dizziness   Xarelto   bleeding precautions  
 therapy  
ongoing history of vertigo   Meclizine   reinforced calling for assistance prior to getting up  
Statement Selected

## 2023-09-11 ENCOUNTER — NURSING HOME VISIT (OUTPATIENT)
Dept: POST ACUTE CARE | Facility: EXTERNAL LOCATION | Age: 58
End: 2023-09-11
Payer: MEDICAID

## 2023-09-11 DIAGNOSIS — R42 DIZZINESS: ICD-10-CM

## 2023-09-11 DIAGNOSIS — R53.1 WEAKNESS: ICD-10-CM

## 2023-09-11 DIAGNOSIS — M25.561 ACUTE PAIN OF RIGHT KNEE: ICD-10-CM

## 2023-09-11 DIAGNOSIS — I48.91 ATRIAL FIBRILLATION, UNSPECIFIED TYPE (MULTI): Primary | ICD-10-CM

## 2023-09-11 PROCEDURE — 99309 SBSQ NF CARE MODERATE MDM 30: CPT

## 2023-09-11 NOTE — LETTER
Patient: John Rojas  : 1965    Encounter Date: 2023    PROGRESS NOTE    Subjective  Chief complaint: John Rojas is a 57 y.o. male who is an acute skilled patient being seen and evaluated for weakness    HPI:  Patient seen and evaluated for general medical care and routine follow-up.  .  Continues to work with therapy for balance and weightbearing.  Patient complains of pain in right knee. XR negative.  L UE brace maintained.  A-fib stable- no SOB,  palpitations, chest pain.  Patient continues to have intermittent dizziness.   HR and BP stable with dizziness. Reinforced calling for assistance prior to getting up.            Objective  Vital signs: 135/73-80-18    Physical Exam  Constitutional:       Appearance: Normal appearance.   HENT:      Head: Normocephalic.      Mouth/Throat:      Mouth: Mucous membranes are moist.   Eyes:      Extraocular Movements: Extraocular movements intact.   Cardiovascular:      Rate and Rhythm: Normal rate. Rhythm irregular.      Pulses: Normal pulses.      Heart sounds: Normal heart sounds.   Pulmonary:      Effort: Pulmonary effort is normal.      Breath sounds: Normal breath sounds.   Abdominal:      General: Bowel sounds are normal.      Palpations: Abdomen is soft.   Musculoskeletal:         General: Normal range of motion.      Cervical back: Normal range of motion and neck supple.      Comments: .  Generalized weakness   brace L UE   right knee pain   Skin:     General: Skin is warm and dry.      Capillary Refill: Capillary refill takes less than 2 seconds.   Neurological:      Mental Status: He is alert and oriented to person, place, and time. Mental status is at baseline.   Psychiatric:         Mood and Affect: Mood normal.         Behavior: Behavior normal.         Assessment/Plan  Problem List Items Addressed This Visit       Weakness      therapy         A-fib (CMS/[facility]) - Primary      Stable   no SOB, palpitations, chest pain  (+) dizziness    Xarelto   bleeding precautions         Dizziness      history of vertigo   Meclizine   reinforced calling for assistance prior to getting up         Knee pain      Stable   pain with mobility   Negative x-ray   pain management   Therapy   monitor          Medications, treatments, and labs reviewed  Continue medications and treatments as listed in EMR    SIMON La      Electronically Signed By: SIMON La   9/20/23  9:40 AM

## 2023-09-13 ENCOUNTER — NURSING HOME VISIT (OUTPATIENT)
Dept: POST ACUTE CARE | Facility: EXTERNAL LOCATION | Age: 58
End: 2023-09-13
Payer: MEDICAID

## 2023-09-13 DIAGNOSIS — S82.201D CLOSED FRACTURE OF RIGHT TIBIA AND FIBULA WITH ROUTINE HEALING, SUBSEQUENT ENCOUNTER: ICD-10-CM

## 2023-09-13 DIAGNOSIS — R53.1 WEAKNESS: Primary | ICD-10-CM

## 2023-09-13 DIAGNOSIS — S82.401D CLOSED FRACTURE OF RIGHT TIBIA AND FIBULA WITH ROUTINE HEALING, SUBSEQUENT ENCOUNTER: ICD-10-CM

## 2023-09-13 PROCEDURE — 99308 SBSQ NF CARE LOW MDM 20: CPT | Performed by: INTERNAL MEDICINE

## 2023-09-13 NOTE — LETTER
Patient: John Rojas  : 1965    Encounter Date: 2023    PROGRESS NOTE    Subjective  Chief complaint: John Rojas is a 57 y.o. male who is an acute skilled patient being seen and evaluated for weakness    HPI:  Patient working in therapy due to weakness and debility. Requires assistance for transfers, ADL's and mobility. Pain from recent fracture controlled. No acute distress.       Objective  Vital signs: 124/68, 98%    Physical Exam  Constitutional:       General: He is not in acute distress.  Eyes:      Extraocular Movements: Extraocular movements intact.   Cardiovascular:      Rate and Rhythm: Normal rate and regular rhythm.   Pulmonary:      Effort: Pulmonary effort is normal.      Breath sounds: Normal breath sounds.   Abdominal:      General: Bowel sounds are normal.      Palpations: Abdomen is soft.   Musculoskeletal:      Cervical back: Neck supple.      Right lower leg: No edema.      Left lower leg: No edema.   Neurological:      Mental Status: He is alert.   Psychiatric:         Mood and Affect: Mood normal.         Behavior: Behavior is cooperative.         Assessment/Plan  Problem List Items Addressed This Visit       Closed fracture of right fibula and tibia     Stabilizer removed  Continue therapy           Weakness - Primary      therapy        Medications, treatments, and labs reviewed  Continue medications and treatments as listed in [facility]    Scribe Attestation  By signing my name below, IRenata Scribe   attest that this documentation has been prepared under the direction and in the presence of Lilia Steve MD.    Provider Attestation - Scribe documentation  All medical record entries made by the Scribe were at my direction and personally dictated by me. I have reviewed the chart and agree that the record accurately reflects my personal performance of the history, physical exam, discussion and plan.  1. Weakness        2. Closed fracture of right tibia and fibula  with routine healing, subsequent encounter              Electronically Signed By: Lilia Steve MD   9/14/23 11:33 AM

## 2023-09-14 NOTE — PROGRESS NOTES
PROGRESS NOTE    Subjective   Chief complaint: John Rojas is a 57 y.o. male who is an acute skilled patient being seen and evaluated for weakness    HPI:  Patient working in therapy due to weakness and debility. Requires assistance for transfers, ADL's and mobility. Pain from recent fracture controlled. No acute distress.       Objective   Vital signs: 124/68, 98%    Physical Exam  Constitutional:       General: He is not in acute distress.  Eyes:      Extraocular Movements: Extraocular movements intact.   Cardiovascular:      Rate and Rhythm: Normal rate and regular rhythm.   Pulmonary:      Effort: Pulmonary effort is normal.      Breath sounds: Normal breath sounds.   Abdominal:      General: Bowel sounds are normal.      Palpations: Abdomen is soft.   Musculoskeletal:      Cervical back: Neck supple.      Right lower leg: No edema.      Left lower leg: No edema.   Neurological:      Mental Status: He is alert.   Psychiatric:         Mood and Affect: Mood normal.         Behavior: Behavior is cooperative.         Assessment/Plan   Problem List Items Addressed This Visit       Closed fracture of right fibula and tibia     Stabilizer removed  Continue therapy           Weakness - Primary      therapy        Medications, treatments, and labs reviewed  Continue medications and treatments as listed in [facility]    Scribe Attestation  By signing my name below, IRenata Scribperla   attest that this documentation has been prepared under the direction and in the presence of Lilia Steve MD.    Provider Attestation - Scribe documentation  All medical record entries made by the Scribe were at my direction and personally dictated by me. I have reviewed the chart and agree that the record accurately reflects my personal performance of the history, physical exam, discussion and plan.  1. Weakness        2. Closed fracture of right tibia and fibula with routine healing, subsequent encounter

## 2023-09-15 ENCOUNTER — NURSING HOME VISIT (OUTPATIENT)
Dept: POST ACUTE CARE | Facility: EXTERNAL LOCATION | Age: 58
End: 2023-09-15
Payer: MEDICAID

## 2023-09-15 DIAGNOSIS — R53.1 WEAKNESS: Primary | ICD-10-CM

## 2023-09-15 DIAGNOSIS — M86.00 ACUTE HEMATOGENOUS OSTEOMYELITIS, UNSPECIFIED SITE (MULTI): ICD-10-CM

## 2023-09-15 DIAGNOSIS — S82.201D CLOSED FRACTURE OF RIGHT TIBIA AND FIBULA WITH ROUTINE HEALING, SUBSEQUENT ENCOUNTER: ICD-10-CM

## 2023-09-15 DIAGNOSIS — S82.401D CLOSED FRACTURE OF RIGHT TIBIA AND FIBULA WITH ROUTINE HEALING, SUBSEQUENT ENCOUNTER: ICD-10-CM

## 2023-09-15 PROCEDURE — 99308 SBSQ NF CARE LOW MDM 20: CPT | Performed by: INTERNAL MEDICINE

## 2023-09-15 NOTE — LETTER
Patient: John Rojas  : 1965    Encounter Date: 09/15/2023    PROGRESS NOTE    Subjective  Chief complaint: John Rojas is a 57 y.o. male who is an acute skilled patient being seen and evaluated for weakness    HPI:   patient continues to work in therapy.  He requires standby assistance with transfers.  Patient uses wheelchair for mobility.  No new issues or concerns today.  pain from recent fracture is controlled with current medications. No acute distress.      Objective  Vital signs:  126/78, 98%    Physical Exam  Constitutional:       General: He is not in acute distress.  Eyes:      Extraocular Movements: Extraocular movements intact.   Cardiovascular:      Rate and Rhythm: Normal rate and regular rhythm.   Pulmonary:      Effort: Pulmonary effort is normal.      Breath sounds: Normal breath sounds.   Abdominal:      General: Bowel sounds are normal.      Palpations: Abdomen is soft.   Musculoskeletal:      Cervical back: Neck supple.      Right lower leg: No edema.      Left lower leg: No edema.   Neurological:      Mental Status: He is alert.   Psychiatric:         Mood and Affect: Mood normal.         Behavior: Behavior is cooperative.         Assessment/Plan  Problem List Items Addressed This Visit       Acute hematogenous osteomyelitis (CMS/HCC)     Left elbow  Immobilizer in place  Cont current meds           Closed fracture of right fibula and tibia     Stabilizer removed  Continue therapy    Follow-up with Ortho   continue pain meds         Weakness - Primary      therapy          Medications, treatments, and labs reviewed  Continue medications and treatments as listed in [facility]    Scribe Attestation  By signing my name below, Renata STEWART Scribe   attest that this documentation has been prepared under the direction and in the presence of Lilia Steve MD.    Provider Attestation - Scribe documentation  All medical record entries made by the Scribe were at my direction and personally  dictated by me. I have reviewed the chart and agree that the record accurately reflects my personal performance of the history, physical exam, discussion and plan.  1. Weakness        2. Closed fracture of right tibia and fibula with routine healing, subsequent encounter        3. Acute hematogenous osteomyelitis, unspecified site (CMS/[facility])              Electronically Signed By: Lilia Steve MD   9/16/23  2:14 PM

## 2023-09-15 NOTE — PROGRESS NOTES
PROGRESS NOTE    Subjective   Chief complaint: John Rojas is a 57 y.o. male who is an acute skilled patient being seen and evaluated for weakness    HPI:   patient continues to work in therapy.  He requires standby assistance with transfers.  Patient uses wheelchair for mobility.  No new issues or concerns today.  pain from recent fracture is controlled with current medications. No acute distress.      Objective   Vital signs:  126/78, 98%    Physical Exam  Constitutional:       General: He is not in acute distress.  Eyes:      Extraocular Movements: Extraocular movements intact.   Cardiovascular:      Rate and Rhythm: Normal rate and regular rhythm.   Pulmonary:      Effort: Pulmonary effort is normal.      Breath sounds: Normal breath sounds.   Abdominal:      General: Bowel sounds are normal.      Palpations: Abdomen is soft.   Musculoskeletal:      Cervical back: Neck supple.      Right lower leg: No edema.      Left lower leg: No edema.   Neurological:      Mental Status: He is alert.   Psychiatric:         Mood and Affect: Mood normal.         Behavior: Behavior is cooperative.         Assessment/Plan   Problem List Items Addressed This Visit       Acute hematogenous osteomyelitis (CMS/HCC)     Left elbow  Immobilizer in place  Cont current meds           Closed fracture of right fibula and tibia     Stabilizer removed  Continue therapy    Follow-up with Ortho   continue pain meds         Weakness - Primary      therapy          Medications, treatments, and labs reviewed  Continue medications and treatments as listed in [facility]    Scribe Attestation  By signing my name below, I, Trisha Salcedo   attest that this documentation has been prepared under the direction and in the presence of Lilia Steve MD.    Provider Attestation - Scribe documentation  All medical record entries made by the Scribe were at my direction and personally dictated by me. I have reviewed the chart and agree that the record  accurately reflects my personal performance of the history, physical exam, discussion and plan.  1. Weakness        2. Closed fracture of right tibia and fibula with routine healing, subsequent encounter        3. Acute hematogenous osteomyelitis, unspecified site (CMS/[facility])

## 2023-09-20 ENCOUNTER — NURSING HOME VISIT (OUTPATIENT)
Dept: POST ACUTE CARE | Facility: EXTERNAL LOCATION | Age: 58
End: 2023-09-20
Payer: MEDICAID

## 2023-09-20 DIAGNOSIS — S82.401D CLOSED FRACTURE OF RIGHT TIBIA AND FIBULA WITH ROUTINE HEALING, SUBSEQUENT ENCOUNTER: ICD-10-CM

## 2023-09-20 DIAGNOSIS — S82.201D CLOSED FRACTURE OF RIGHT TIBIA AND FIBULA WITH ROUTINE HEALING, SUBSEQUENT ENCOUNTER: ICD-10-CM

## 2023-09-20 DIAGNOSIS — R53.1 WEAKNESS: Primary | ICD-10-CM

## 2023-09-20 PROBLEM — M25.569 KNEE PAIN: Status: ACTIVE | Noted: 2023-09-20

## 2023-09-20 PROCEDURE — 99309 SBSQ NF CARE MODERATE MDM 30: CPT | Performed by: INTERNAL MEDICINE

## 2023-09-20 NOTE — LETTER
Patient: John Rojas  : 1965    Encounter Date: 2023    PROGRESS NOTE    Subjective  Chief complaint: John Rojas is a 57 y.o. male who is an acute skilled patient being seen and evaluated for weakness    HPI:   patient continues to work in therapy.  He requires standby assistance with transfers.  Patient uses wheelchair for mobility.  pain from recent fracture is controlled with current medications. No acute distress.      Objective  Vital signs:  128/74, 98%    Physical Exam  Constitutional:       General: He is not in acute distress.  Eyes:      Extraocular Movements: Extraocular movements intact.   Cardiovascular:      Rate and Rhythm: Normal rate and regular rhythm.   Pulmonary:      Effort: Pulmonary effort is normal.      Breath sounds: Normal breath sounds.   Abdominal:      General: Bowel sounds are normal.      Palpations: Abdomen is soft.   Musculoskeletal:      Cervical back: Neck supple.      Right lower leg: No edema.      Left lower leg: No edema.   Neurological:      Mental Status: He is alert.   Psychiatric:         Mood and Affect: Mood normal.         Behavior: Behavior is cooperative.         Assessment/Plan  Problem List Items Addressed This Visit       Closed fracture of right fibula and tibia     Stabilizer removed  Continue therapy    Follow-up with Ortho   continue pain meds         Weakness - Primary      therapy        Medications, treatments, and labs reviewed  Continue medications and treatments as listed in [facility]    Scribe Attestation  By signing my name below, I, Trisha Salcedo   attest that this documentation has been prepared under the direction and in the presence of Lilia Steve MD.    Provider Attestation - Scribe documentation  All medical record entries made by the Scribe were at my direction and personally dictated by me. I have reviewed the chart and agree that the record accurately reflects my personal performance of the history, physical exam,  discussion and plan.  1. Weakness        2. Closed fracture of right tibia and fibula with routine healing, subsequent encounter                Electronically Signed By: Lilia Steve MD   9/21/23  1:13 PM

## 2023-09-20 NOTE — PROGRESS NOTES
PROGRESS NOTE    Subjective   Chief complaint: John Rojas is a 57 y.o. male who is an acute skilled patient being seen and evaluated for weakness    HPI:   patient continues to work in therapy.  He requires standby assistance with transfers.  Patient uses wheelchair for mobility.  pain from recent fracture is controlled with current medications. No acute distress.      Objective   Vital signs:  128/74, 98%    Physical Exam  Constitutional:       General: He is not in acute distress.  Eyes:      Extraocular Movements: Extraocular movements intact.   Cardiovascular:      Rate and Rhythm: Normal rate and regular rhythm.   Pulmonary:      Effort: Pulmonary effort is normal.      Breath sounds: Normal breath sounds.   Abdominal:      General: Bowel sounds are normal.      Palpations: Abdomen is soft.   Musculoskeletal:      Cervical back: Neck supple.      Right lower leg: No edema.      Left lower leg: No edema.   Neurological:      Mental Status: He is alert.   Psychiatric:         Mood and Affect: Mood normal.         Behavior: Behavior is cooperative.         Assessment/Plan   Problem List Items Addressed This Visit       Closed fracture of right fibula and tibia     Stabilizer removed  Continue therapy    Follow-up with Ortho   continue pain meds         Weakness - Primary      therapy        Medications, treatments, and labs reviewed  Continue medications and treatments as listed in [facility]    Scribe Attestation  By signing my name below, IRenata Scribe   attest that this documentation has been prepared under the direction and in the presence of Lilia Steve MD.    Provider Attestation - Scribe documentation  All medical record entries made by the Scribe were at my direction and personally dictated by me. I have reviewed the chart and agree that the record accurately reflects my personal performance of the history, physical exam, discussion and plan.  1. Weakness        2. Closed fracture of right  tibia and fibula with routine healing, subsequent encounter

## 2023-09-20 NOTE — PROGRESS NOTES
PROGRESS NOTE    Subjective   Chief complaint: John Rojas is a 57 y.o. male who is an acute skilled patient being seen and evaluated for weakness    HPI:  Patient seen and evaluated for general medical care and routine follow-up.  .  Continues to work with therapy for balance and weightbearing.  Patient complains of pain in right knee. XR negative.  L UE brace maintained.  A-fib stable- no SOB,  palpitations, chest pain.  Patient continues to have intermittent dizziness.   HR and BP stable with dizziness. Reinforced calling for assistance prior to getting up.            Objective   Vital signs: 135/73-80-18    Physical Exam  Constitutional:       Appearance: Normal appearance.   HENT:      Head: Normocephalic.      Mouth/Throat:      Mouth: Mucous membranes are moist.   Eyes:      Extraocular Movements: Extraocular movements intact.   Cardiovascular:      Rate and Rhythm: Normal rate. Rhythm irregular.      Pulses: Normal pulses.      Heart sounds: Normal heart sounds.   Pulmonary:      Effort: Pulmonary effort is normal.      Breath sounds: Normal breath sounds.   Abdominal:      General: Bowel sounds are normal.      Palpations: Abdomen is soft.   Musculoskeletal:         General: Normal range of motion.      Cervical back: Normal range of motion and neck supple.      Comments: .  Generalized weakness   brace L UE   right knee pain   Skin:     General: Skin is warm and dry.      Capillary Refill: Capillary refill takes less than 2 seconds.   Neurological:      Mental Status: He is alert and oriented to person, place, and time. Mental status is at baseline.   Psychiatric:         Mood and Affect: Mood normal.         Behavior: Behavior normal.         Assessment/Plan   Problem List Items Addressed This Visit       Weakness      therapy         A-fib (CMS/[facility]) - Primary      Stable   no SOB, palpitations, chest pain  (+) dizziness   Xarelto   bleeding precautions         Dizziness      history of vertigo    Meclizine   reinforced calling for assistance prior to getting up         Knee pain      Stable   pain with mobility   Negative x-ray   pain management   Therapy   monitor          Medications, treatments, and labs reviewed  Continue medications and treatments as listed in EMR    Larissa Laguerre, APRN-CNP

## 2023-09-22 ENCOUNTER — NURSING HOME VISIT (OUTPATIENT)
Dept: POST ACUTE CARE | Facility: EXTERNAL LOCATION | Age: 58
End: 2023-09-22
Payer: MEDICAID

## 2023-09-22 DIAGNOSIS — R53.1 WEAKNESS: Primary | ICD-10-CM

## 2023-09-22 DIAGNOSIS — S82.201D CLOSED FRACTURE OF RIGHT TIBIA AND FIBULA WITH ROUTINE HEALING, SUBSEQUENT ENCOUNTER: ICD-10-CM

## 2023-09-22 DIAGNOSIS — S82.401D CLOSED FRACTURE OF RIGHT TIBIA AND FIBULA WITH ROUTINE HEALING, SUBSEQUENT ENCOUNTER: ICD-10-CM

## 2023-09-22 PROCEDURE — 99308 SBSQ NF CARE LOW MDM 20: CPT | Performed by: INTERNAL MEDICINE

## 2023-09-22 NOTE — LETTER
Patient: John Rojas  : 1965    Encounter Date: 2023    PROGRESS NOTE    Subjective  Chief complaint: John Rojas is a 58 y.o. male who is an acute skilled patient being seen and evaluated for weakness    HPI:   patient continues to work in therapy.  He requires standby assistance with transfers.  Patient uses wheelchair for mobility.  Denies Pain at this time. No acute distress.      Objective  Vital signs:  128/74, 98%    Physical Exam  Constitutional:       General: He is not in acute distress.  Eyes:      Extraocular Movements: Extraocular movements intact.   Cardiovascular:      Rate and Rhythm: Normal rate and regular rhythm.   Pulmonary:      Effort: Pulmonary effort is normal.      Breath sounds: Normal breath sounds.   Abdominal:      General: Bowel sounds are normal.      Palpations: Abdomen is soft.   Musculoskeletal:      Cervical back: Neck supple.      Right lower leg: No edema.      Left lower leg: No edema.   Neurological:      Mental Status: He is alert.   Psychiatric:         Mood and Affect: Mood normal.         Behavior: Behavior is cooperative.         Assessment/Plan  Problem List Items Addressed This Visit       Closed fracture of right fibula and tibia     Stabilizer removed  Continue therapy    Follow-up with Ortho   continue pain meds         Weakness - Primary      therapy        Medications, treatments, and labs reviewed  Continue medications and treatments as listed in [facility]    Scribe Attestation  By signing my name below, I, Trisha Salcedo   attest that this documentation has been prepared under the direction and in the presence of Lilia Steve MD.    Provider Attestation - Scribe documentation  All medical record entries made by the Scribe were at my direction and personally dictated by me. I have reviewed the chart and agree that the record accurately reflects my personal performance of the history, physical exam, discussion and plan.  1. Weakness         2. Closed fracture of right tibia and fibula with routine healing, subsequent encounter                  Electronically Signed By: Lilia Steve MD   9/25/23  1:22 PM

## 2023-09-25 ENCOUNTER — NURSING HOME VISIT (OUTPATIENT)
Dept: POST ACUTE CARE | Facility: EXTERNAL LOCATION | Age: 58
End: 2023-09-25
Payer: MEDICAID

## 2023-09-25 DIAGNOSIS — R42 DIZZINESS: ICD-10-CM

## 2023-09-25 DIAGNOSIS — M86.00 ACUTE HEMATOGENOUS OSTEOMYELITIS, UNSPECIFIED SITE (MULTI): ICD-10-CM

## 2023-09-25 DIAGNOSIS — R53.1 WEAKNESS: ICD-10-CM

## 2023-09-25 DIAGNOSIS — M25.561 ACUTE PAIN OF RIGHT KNEE: ICD-10-CM

## 2023-09-25 DIAGNOSIS — I48.91 ATRIAL FIBRILLATION, UNSPECIFIED TYPE (MULTI): Primary | ICD-10-CM

## 2023-09-25 PROCEDURE — 99309 SBSQ NF CARE MODERATE MDM 30: CPT

## 2023-09-25 NOTE — LETTER
Patient: John Rojas  : 1965    Encounter Date: 2023    PROGRESS NOTE    Subjective  Chief complaint: John Rojas is a 58 y.o. male who is a long term care patient being seen and evaluated for general medical care and follow-up    HPI:  Patient seen and evaluated for general medical care.  Patient continues to work with therapy for gait, transfers, mobility.  Brace maintained L UE.  Patient continues to complain of right knee pain.  A-fib stable- no SOB, palpitations, fatigue.  Reinforced calling for assistance prior to attempting to transfer due to dizziness and falls.  No concerns per nursing          Objective  Vital signs:  121/75-96.5-96-18    Physical Exam  Constitutional:       Appearance: Normal appearance.   HENT:      Head: Normocephalic.      Mouth/Throat:      Mouth: Mucous membranes are moist.   Eyes:      Extraocular Movements: Extraocular movements intact.   Cardiovascular:      Rate and Rhythm: Normal rate. Rhythm irregular.      Pulses: Normal pulses.      Heart sounds: Normal heart sounds.   Pulmonary:      Effort: Pulmonary effort is normal.      Breath sounds: Normal breath sounds.   Abdominal:      General: Bowel sounds are normal.      Palpations: Abdomen is soft.   Musculoskeletal:      Cervical back: Normal range of motion and neck supple.      Comments:  generalized weakness   right knee pain   brace to L UE   Skin:     General: Skin is warm and dry.      Capillary Refill: Capillary refill takes less than 2 seconds.   Neurological:      Mental Status: He is alert. Mental status is at baseline.   Psychiatric:         Mood and Affect: Mood normal.         Behavior: Behavior normal.         Assessment/Plan  Problem List Items Addressed This Visit       Acute hematogenous osteomyelitis (CMS/HCC)      Stable   brace to L UE   surgical amputation scheduled for October   pain management   continue current therapy   monitor         Weakness      therapy         A-fib (CMS/[facility])  - Primary      Stable   no SOB, palpitations, chest pain  (+) dizziness   Xarelto   bleeding precautions         Dizziness      history of vertigo   Meclizine   reinforced calling for assistance prior to getting up         Knee pain      Stable   pain with mobility   pain management   Therapy   monitor          Medications, treatments, and labs reviewed  Continue medications and treatments as listed in EMR    SIMON La      Electronically Signed By: SIMON La   9/27/23  8:42 PM

## 2023-09-25 NOTE — PROGRESS NOTES
PROGRESS NOTE    Subjective   Chief complaint: John Rojas is a 58 y.o. male who is an acute skilled patient being seen and evaluated for weakness    HPI:   patient continues to work in therapy.  He requires standby assistance with transfers.  Patient uses wheelchair for mobility.  Denies Pain at this time. No acute distress.      Objective   Vital signs:  128/74, 98%    Physical Exam  Constitutional:       General: He is not in acute distress.  Eyes:      Extraocular Movements: Extraocular movements intact.   Cardiovascular:      Rate and Rhythm: Normal rate and regular rhythm.   Pulmonary:      Effort: Pulmonary effort is normal.      Breath sounds: Normal breath sounds.   Abdominal:      General: Bowel sounds are normal.      Palpations: Abdomen is soft.   Musculoskeletal:      Cervical back: Neck supple.      Right lower leg: No edema.      Left lower leg: No edema.   Neurological:      Mental Status: He is alert.   Psychiatric:         Mood and Affect: Mood normal.         Behavior: Behavior is cooperative.         Assessment/Plan   Problem List Items Addressed This Visit       Closed fracture of right fibula and tibia     Stabilizer removed  Continue therapy    Follow-up with Ortho   continue pain meds         Weakness - Primary      therapy        Medications, treatments, and labs reviewed  Continue medications and treatments as listed in [facility]    Scribe Attestation  By signing my name below, IRenata Scribe   attest that this documentation has been prepared under the direction and in the presence of Lilia Steve MD.    Provider Attestation - Scribe documentation  All medical record entries made by the Scribe were at my direction and personally dictated by me. I have reviewed the chart and agree that the record accurately reflects my personal performance of the history, physical exam, discussion and plan.  1. Weakness        2. Closed fracture of right tibia and fibula with routine healing,  subsequent encounter

## 2023-09-27 ENCOUNTER — NURSING HOME VISIT (OUTPATIENT)
Dept: POST ACUTE CARE | Facility: EXTERNAL LOCATION | Age: 58
End: 2023-09-27
Payer: MEDICAID

## 2023-09-27 DIAGNOSIS — S82.401D CLOSED FRACTURE OF RIGHT TIBIA AND FIBULA WITH ROUTINE HEALING, SUBSEQUENT ENCOUNTER: ICD-10-CM

## 2023-09-27 DIAGNOSIS — S82.201D CLOSED FRACTURE OF RIGHT TIBIA AND FIBULA WITH ROUTINE HEALING, SUBSEQUENT ENCOUNTER: ICD-10-CM

## 2023-09-27 DIAGNOSIS — M86.00 ACUTE HEMATOGENOUS OSTEOMYELITIS, UNSPECIFIED SITE (MULTI): ICD-10-CM

## 2023-09-27 DIAGNOSIS — R53.1 WEAKNESS: Primary | ICD-10-CM

## 2023-09-27 PROCEDURE — 99308 SBSQ NF CARE LOW MDM 20: CPT | Performed by: INTERNAL MEDICINE

## 2023-09-27 NOTE — LETTER
Patient: John Rojas  : 1965    Encounter Date: 2023    PROGRESS NOTE    Subjective  Chief complaint: John Rojas is a 58 y.o. male who is a long term care patient being seen and evaluated for weakness    HPI:  Patient had been working in therapy and was discharged. He requires minimal assistance for transfers and ADL's and uses wheelchair for mobility.       Objective  Vital signs: 127/64, 98%    Physical Exam  Constitutional:       General: He is not in acute distress.  Eyes:      Extraocular Movements: Extraocular movements intact.   Cardiovascular:      Rate and Rhythm: Normal rate and regular rhythm.   Pulmonary:      Effort: Pulmonary effort is normal.      Breath sounds: Normal breath sounds.   Abdominal:      General: Bowel sounds are normal.      Palpations: Abdomen is soft.   Musculoskeletal:      Cervical back: Neck supple.      Right lower leg: No edema.      Left lower leg: No edema.      Comments: Brace LUE   Neurological:      Mental Status: He is alert.   Psychiatric:         Mood and Affect: Mood normal.         Behavior: Behavior is cooperative.         Assessment/Plan  Problem List Items Addressed This Visit       Acute hematogenous osteomyelitis (CMS/HCC)      Stable   brace to L UE   surgical amputation scheduled for October   pain management   monitor         Closed fracture of right fibula and tibia     Follow up with Ortho  Pain meds         Weakness - Primary      Discharged from therapy          Medications, treatments, and labs reviewed  Continue medications and treatments as listed in [facility]    Scribe Attestation  By signing my name below, IRenata Scribe   attest that this documentation has been prepared under the direction and in the presence of Lilia Steve MD.    Provider Attestation - Scribe documentation  All medical record entries made by the Scribe were at my direction and personally dictated by me. I have reviewed the chart and agree that the record  accurately reflects my personal performance of the history, physical exam, discussion and plan.    1. Weakness        2. Closed fracture of right tibia and fibula with routine healing, subsequent encounter        3. Acute hematogenous osteomyelitis, unspecified site (CMS/[facility])               Electronically Signed By: Lilia Steve MD   9/28/23 10:48 AM

## 2023-09-28 NOTE — PROGRESS NOTES
PROGRESS NOTE    Subjective   Chief complaint: John Rojas is a 58 y.o. male who is a long term care patient being seen and evaluated for general medical care and follow-up    HPI:  Patient seen and evaluated for general medical care.  Patient continues to work with therapy for gait, transfers, mobility.  Brace maintained L UE.  Patient continues to complain of right knee pain.  A-fib stable- no SOB, palpitations, fatigue.  Reinforced calling for assistance prior to attempting to transfer due to dizziness and falls.  No concerns per nursing          Objective   Vital signs:  121/75-96.5-96-18    Physical Exam  Constitutional:       Appearance: Normal appearance.   HENT:      Head: Normocephalic.      Mouth/Throat:      Mouth: Mucous membranes are moist.   Eyes:      Extraocular Movements: Extraocular movements intact.   Cardiovascular:      Rate and Rhythm: Normal rate. Rhythm irregular.      Pulses: Normal pulses.      Heart sounds: Normal heart sounds.   Pulmonary:      Effort: Pulmonary effort is normal.      Breath sounds: Normal breath sounds.   Abdominal:      General: Bowel sounds are normal.      Palpations: Abdomen is soft.   Musculoskeletal:      Cervical back: Normal range of motion and neck supple.      Comments:  generalized weakness   right knee pain   brace to L UE   Skin:     General: Skin is warm and dry.      Capillary Refill: Capillary refill takes less than 2 seconds.   Neurological:      Mental Status: He is alert. Mental status is at baseline.   Psychiatric:         Mood and Affect: Mood normal.         Behavior: Behavior normal.         Assessment/Plan   Problem List Items Addressed This Visit       Acute hematogenous osteomyelitis (CMS/HCC)      Stable   brace to L UE   surgical amputation scheduled for October   pain management   continue current therapy   monitor         Weakness      therapy         A-fib (CMS/HCC) - Primary      Stable   no SOB, palpitations, chest pain  (+)  dizziness   Xarelto   bleeding precautions         Dizziness      history of vertigo   Meclizine   reinforced calling for assistance prior to getting up         Knee pain      Stable   pain with mobility   pain management   Therapy   monitor          Medications, treatments, and labs reviewed  Continue medications and treatments as listed in EMR    Larissa Laguerre, APRN-CNP

## 2023-09-28 NOTE — PROGRESS NOTES
PROGRESS NOTE    Subjective   Chief complaint: John Rojas is a 58 y.o. male who is a long term care patient being seen and evaluated for weakness    HPI:  Patient had been working in therapy and was discharged. He requires minimal assistance for transfers and ADL's and uses wheelchair for mobility.       Objective   Vital signs: 127/64, 98%    Physical Exam  Constitutional:       General: He is not in acute distress.  Eyes:      Extraocular Movements: Extraocular movements intact.   Cardiovascular:      Rate and Rhythm: Normal rate and regular rhythm.   Pulmonary:      Effort: Pulmonary effort is normal.      Breath sounds: Normal breath sounds.   Abdominal:      General: Bowel sounds are normal.      Palpations: Abdomen is soft.   Musculoskeletal:      Cervical back: Neck supple.      Right lower leg: No edema.      Left lower leg: No edema.      Comments: Brace LUE   Neurological:      Mental Status: He is alert.   Psychiatric:         Mood and Affect: Mood normal.         Behavior: Behavior is cooperative.         Assessment/Plan   Problem List Items Addressed This Visit       Acute hematogenous osteomyelitis (CMS/HCC)      Stable   brace to L UE   surgical amputation scheduled for October   pain management   monitor         Closed fracture of right fibula and tibia     Follow up with Ortho  Pain meds         Weakness - Primary      Discharged from therapy          Medications, treatments, and labs reviewed  Continue medications and treatments as listed in [facility]    Scribe Attestation  By signing my name below, I, Trisha Salcedo   attest that this documentation has been prepared under the direction and in the presence of Lilia Steve MD.    Provider Attestation - Scribe documentation  All medical record entries made by the Scribe were at my direction and personally dictated by me. I have reviewed the chart and agree that the record accurately reflects my personal performance of the history, physical  exam, discussion and plan.    1. Weakness        2. Closed fracture of right tibia and fibula with routine healing, subsequent encounter        3. Acute hematogenous osteomyelitis, unspecified site (CMS/[facility])

## 2023-09-28 NOTE — ASSESSMENT & PLAN NOTE
Stable   brace to L UE   surgical amputation scheduled for October   pain management   continue current therapy   monitor

## 2023-10-04 ENCOUNTER — NURSING HOME VISIT (OUTPATIENT)
Dept: POST ACUTE CARE | Facility: EXTERNAL LOCATION | Age: 58
End: 2023-10-04
Payer: MEDICAID

## 2023-10-04 DIAGNOSIS — S82.201D CLOSED FRACTURE OF RIGHT TIBIA AND FIBULA WITH ROUTINE HEALING, SUBSEQUENT ENCOUNTER: ICD-10-CM

## 2023-10-04 DIAGNOSIS — I48.91 ATRIAL FIBRILLATION, UNSPECIFIED TYPE (MULTI): Primary | ICD-10-CM

## 2023-10-04 DIAGNOSIS — Z91.199 NON-COMPLIANT PATIENT: ICD-10-CM

## 2023-10-04 DIAGNOSIS — S82.401D CLOSED FRACTURE OF RIGHT TIBIA AND FIBULA WITH ROUTINE HEALING, SUBSEQUENT ENCOUNTER: ICD-10-CM

## 2023-10-04 PROCEDURE — 99309 SBSQ NF CARE MODERATE MDM 30: CPT | Performed by: INTERNAL MEDICINE

## 2023-10-04 NOTE — PROGRESS NOTES
PROGRESS NOTE    Subjective   Chief complaint: John Rojas is a 58 y.o. male who is a long term care patient being seen and evaluated for monthly general medical care and follow-up.    HPI:   patient presents for general medical care and f/u.  Patient seen and examined at bedside.  No issues per nursing.  Patient has no acute complaints.    AFIB stable, denies palpitations and chest pain.  Pain from recent fracture is controlled with current medications.  According to nursing staff he has been refusing medications and non compliant.   No acute distress.      Objective   Vital signs:   126/67, 98%     Physical Exam  Constitutional:       General: He is not in acute distress.  Eyes:      Extraocular Movements: Extraocular movements intact.   Cardiovascular:      Rate and Rhythm: Normal rate and regular rhythm.   Pulmonary:      Effort: Pulmonary effort is normal.      Breath sounds: Normal breath sounds.   Abdominal:      General: Bowel sounds are normal.      Palpations: Abdomen is soft.   Musculoskeletal:      Cervical back: Neck supple.      Right lower leg: No edema.      Left lower leg: No edema.   Neurological:      Mental Status: He is alert.   Psychiatric:         Mood and Affect: Mood normal.         Behavior: Behavior is cooperative.         Assessment/Plan   Problem List Items Addressed This Visit       Closed fracture of right fibula and tibia     Follow up with Ortho  Pain meds         A-fib (CMS/[facility]) - Primary      Stable   no SOB, palpitations, chest pain  (+) dizziness   Xarelto   bleeding precautions         Non-compliant patient     Refusing medications        Medications, treatments, and labs reviewed  Continue medications and treatments as listed in [facility]    Scribe Attestation  By signing my name below, IRenata, Ireneibperla   attest that this documentation has been prepared under the direction and in the presence of Lilia Steve MD.    Provider Attestation - Scribe documentation  All  medical record entries made by the Scribe were at my direction and personally dictated by me. I have reviewed the chart and agree that the record accurately reflects my personal performance of the history, physical exam, discussion and plan.    1. Atrial fibrillation, unspecified type (CMS/HCC)        2. Closed fracture of right tibia and fibula with routine healing, subsequent encounter        3. Non-compliant patient

## 2023-10-04 NOTE — LETTER
Patient: John Rojas  : 1965    Encounter Date: 10/04/2023    PROGRESS NOTE    Subjective  Chief complaint: John Rojas is a 58 y.o. male who is a long term care patient being seen and evaluated for monthly general medical care and follow-up.    HPI:   patient presents for general medical care and f/u.  Patient seen and examined at bedside.  No issues per nursing.  Patient has no acute complaints.    AFIB stable, denies palpitations and chest pain.  Pain from recent fracture is controlled with current medications.  According to nursing staff he has been refusing medications and non compliant.   No acute distress.      Objective  Vital signs:   126/67, 98%     Physical Exam  Constitutional:       General: He is not in acute distress.  Eyes:      Extraocular Movements: Extraocular movements intact.   Cardiovascular:      Rate and Rhythm: Normal rate and regular rhythm.   Pulmonary:      Effort: Pulmonary effort is normal.      Breath sounds: Normal breath sounds.   Abdominal:      General: Bowel sounds are normal.      Palpations: Abdomen is soft.   Musculoskeletal:      Cervical back: Neck supple.      Right lower leg: No edema.      Left lower leg: No edema.   Neurological:      Mental Status: He is alert.   Psychiatric:         Mood and Affect: Mood normal.         Behavior: Behavior is cooperative.         Assessment/Plan  Problem List Items Addressed This Visit       Closed fracture of right fibula and tibia     Follow up with Ortho  Pain meds         A-fib (CMS/[facility]) - Primary      Stable   no SOB, palpitations, chest pain  (+) dizziness   Xarelto   bleeding precautions         Non-compliant patient     Refusing medications        Medications, treatments, and labs reviewed  Continue medications and treatments as listed in [facility]    Scribe Attestation  By signing my name below, IRenata Scribe   attest that this documentation has been prepared under the direction and in the presence of  Lilia Steve MD.    Provider Attestation - Scribe documentation  All medical record entries made by the Scribe were at my direction and personally dictated by me. I have reviewed the chart and agree that the record accurately reflects my personal performance of the history, physical exam, discussion and plan.    1. Atrial fibrillation, unspecified type (CMS/[facility])        2. Closed fracture of right tibia and fibula with routine healing, subsequent encounter        3. Non-compliant patient               Electronically Signed By: Lilia Steve MD   10/4/23  3:34 PM

## 2023-10-18 ENCOUNTER — NURSING HOME VISIT (OUTPATIENT)
Dept: POST ACUTE CARE | Facility: EXTERNAL LOCATION | Age: 58
End: 2023-10-18
Payer: MEDICAID

## 2023-10-18 DIAGNOSIS — M62.838 MUSCLE SPASM: Primary | ICD-10-CM

## 2023-10-18 DIAGNOSIS — R42 VERTIGO: ICD-10-CM

## 2023-10-18 PROCEDURE — 99308 SBSQ NF CARE LOW MDM 20: CPT | Performed by: INTERNAL MEDICINE

## 2023-10-18 NOTE — ASSESSMENT & PLAN NOTE
Continue meclizine   gradual dose reduction clinically contraindicated at this time.  Continued use is within current practice guidelines and medication reduction may cause return or worsening of symptoms.  Patient is stable and without side effects of therapy and these continue to be monitored.

## 2023-10-18 NOTE — ASSESSMENT & PLAN NOTE
Continue cyclobenzaprine   gradual dose reduction clinically contraindicated at this time.  Continued use is within current practice guidelines and medication reduction may cause return or worsening of symptoms.  Patient is stable and without side effects of therapy and these continue to be monitored.

## 2023-10-18 NOTE — PROGRESS NOTES
PROGRESS NOTE    Subjective   Chief complaint: John Rojas is a 58 y.o. male who is a long term care patient being seen and evaluated for   General medical care    HPI:   I was asked to see patient and evaluate continued use of meclizine and cyclobenzaprine.   Patient has been taking his medication for some time with good effect. Patient suffers from muscle spasms and medication are effective for him. Patient also has episodes of vertigo which are controlled well. No acute distress.       Objective   Vital signs:  129/72, 98%    Physical Exam  Constitutional:       General: He is not in acute distress.  Eyes:      Extraocular Movements: Extraocular movements intact.   Cardiovascular:      Rate and Rhythm: Normal rate and regular rhythm.   Pulmonary:      Effort: Pulmonary effort is normal.      Breath sounds: Normal breath sounds.   Abdominal:      General: Bowel sounds are normal.      Palpations: Abdomen is soft.   Musculoskeletal:      Cervical back: Neck supple.      Right lower leg: No edema.      Left lower leg: No edema.   Neurological:      Mental Status: He is alert.   Psychiatric:         Mood and Affect: Mood normal.         Behavior: Behavior is cooperative.         Assessment/Plan   Problem List Items Addressed This Visit       Vertigo     Continue meclizine   gradual dose reduction clinically contraindicated at this time.  Continued use is within current practice guidelines and medication reduction may cause return or worsening of symptoms.  Patient is stable and without side effects of therapy and these continue to be monitored.             Muscle spasm - Primary       Continue cyclobenzaprine   gradual dose reduction clinically contraindicated at this time.  Continued use is within current practice guidelines and medication reduction may cause return or worsening of symptoms.  Patient is stable and without side effects of therapy and these continue to be monitored.              Medications,  treatments, and labs reviewed  Continue medications and treatments as listed in [facility]    Scribe Attestation  By signing my name below, I, Trisha Salcedo   attest that this documentation has been prepared under the direction and in the presence of Lilia Steve MD.    Provider Attestation - Scribe documentation  All medical record entries made by the Scribe were at my direction and personally dictated by me. I have reviewed the chart and agree that the record accurately reflects my personal performance of the history, physical exam, discussion and plan.    1. Muscle spasm        2. Vertigo

## 2023-10-18 NOTE — LETTER
Patient: John Rojas  : 1965    Encounter Date: 10/18/2023    PROGRESS NOTE    Subjective  Chief complaint: John Rojas is a 58 y.o. male who is a long term care patient being seen and evaluated for   General medical care    HPI:   I was asked to see patient and evaluate continued use of meclizine and cyclobenzaprine.   Patient has been taking his medication for some time with good effect. Patient suffers from muscle spasms and medication are effective for him. Patient also has episodes of vertigo which are controlled well. No acute distress.       Objective  Vital signs:  129/72, 98%    Physical Exam  Constitutional:       General: He is not in acute distress.  Eyes:      Extraocular Movements: Extraocular movements intact.   Cardiovascular:      Rate and Rhythm: Normal rate and regular rhythm.   Pulmonary:      Effort: Pulmonary effort is normal.      Breath sounds: Normal breath sounds.   Abdominal:      General: Bowel sounds are normal.      Palpations: Abdomen is soft.   Musculoskeletal:      Cervical back: Neck supple.      Right lower leg: No edema.      Left lower leg: No edema.   Neurological:      Mental Status: He is alert.   Psychiatric:         Mood and Affect: Mood normal.         Behavior: Behavior is cooperative.         Assessment/Plan  Problem List Items Addressed This Visit       Vertigo     Continue meclizine   gradual dose reduction clinically contraindicated at this time.  Continued use is within current practice guidelines and medication reduction may cause return or worsening of symptoms.  Patient is stable and without side effects of therapy and these continue to be monitored.             Muscle spasm - Primary       Continue cyclobenzaprine   gradual dose reduction clinically contraindicated at this time.  Continued use is within current practice guidelines and medication reduction may cause return or worsening of symptoms.  Patient is stable and without side effects of  therapy and these continue to be monitored.              Medications, treatments, and labs reviewed  Continue medications and treatments as [facility]    Scribe Attestation  By signing my name below, I, Trisha Salcedo   attest that this documentation has been prepared under the direction and in the presence of Lilia Steve MD.    Provider Attestation - Scribe documentation  All medical record entries made by the Scribe were at my direction and personally dictated by me. I have reviewed the chart and agree that the record accurately reflects my personal performance of the history, physical exam, discussion and plan.    1. Muscle spasm        2. Vertigo               Electronically Signed By: Lilia Steve MD   10/18/23  6:24 PM

## 2023-10-23 ENCOUNTER — NURSING HOME VISIT (OUTPATIENT)
Dept: POST ACUTE CARE | Facility: EXTERNAL LOCATION | Age: 58
End: 2023-10-23
Payer: MEDICAID

## 2023-10-23 DIAGNOSIS — Z89.222: Primary | ICD-10-CM

## 2023-10-23 DIAGNOSIS — R53.1 WEAKNESS: ICD-10-CM

## 2023-10-23 DIAGNOSIS — G54.6: ICD-10-CM

## 2023-10-23 PROCEDURE — 99309 SBSQ NF CARE MODERATE MDM 30: CPT | Performed by: NURSE PRACTITIONER

## 2023-10-23 NOTE — PROGRESS NOTES
PROGRESS NOTE    Subjective   Chief complaint: John Rojas is a 58 y.o. male who is an acute skilled patient being seen and evaluated for weakness    HPI: Has been readmitted for rehabilitation S/P amputation left arm above elbow.   Is pleasant and talkative. Reports that he is having sharp pain with phantom pain in his left arm. Reports that he has the sensation to scratch at his missing left hand.     HPI      Objective   Vital signs: 137/66-82-18-97.2     Physical Exam  Vitals and nursing note reviewed.   Constitutional:       General: He is not in acute distress.  HENT:      Head: Normocephalic.      Mouth/Throat:      Mouth: Mucous membranes are moist.      Pharynx: Oropharynx is clear.   Eyes:      Extraocular Movements: Extraocular movements intact.   Cardiovascular:      Rate and Rhythm: Normal rate and regular rhythm.      Pulses: Normal pulses.      Heart sounds: Normal heart sounds.   Pulmonary:      Effort: Pulmonary effort is normal.      Breath sounds: Normal breath sounds and air entry.      Comments: Lungs clear   Abdominal:      General: Abdomen is flat. Bowel sounds are normal.      Palpations: Abdomen is soft.   Musculoskeletal:      Cervical back: Neck supple.      Right lower leg: No edema.      Left lower leg: No edema.      Comments: Ambulatory  S/P amputation left arm, above elbow. Covered with surgical dressing.    Skin:     General: Skin is warm.   Neurological:      General: No focal deficit present.      Mental Status: He is alert. Mental status is at baseline.   Psychiatric:         Mood and Affect: Mood normal.         Behavior: Behavior is cooperative.      Comments: Mood has greatly improved. 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

## 2023-10-23 NOTE — LETTER
Patient: John Rojas  : 1965    Encounter Date: 10/23/2023    PROGRESS NOTE    Subjective  Chief complaint: John Rojas is a 58 y.o. male who is an acute skilled patient being seen and evaluated for weakness    HPI: Has been readmitted for rehabilitation S/P amputation left arm above elbow.   Is pleasant and talkative. Reports that he is having sharp pain with phantom pain in his left arm. Reports that he has the sensation to scratch at his missing left hand.     HPI      Objective  Vital signs: 137/66-82-18-97.2     Physical Exam  Vitals and nursing note reviewed.   Constitutional:       General: He is not in acute distress.  HENT:      Head: Normocephalic.      Mouth/Throat:      Mouth: Mucous membranes are moist.      Pharynx: Oropharynx is clear.   Eyes:      Extraocular Movements: Extraocular movements intact.   Cardiovascular:      Rate and Rhythm: Normal rate and regular rhythm.      Pulses: Normal pulses.      Heart sounds: Normal heart sounds.   Pulmonary:      Effort: Pulmonary effort is normal.      Breath sounds: Normal breath sounds and air entry.      Comments: Lungs clear   Abdominal:      General: Abdomen is flat. Bowel sounds are normal.      Palpations: Abdomen is soft.   Musculoskeletal:      Cervical back: Neck supple.      Right lower leg: No edema.      Left lower leg: No edema.      Comments: Ambulatory  S/P amputation left arm, above elbow. Covered with surgical dressing.    Skin:     General: Skin is warm.   Neurological:      General: No focal deficit present.      Mental Status: He is alert. Mental status is at baseline.   Psychiatric:         Mood and Affect: Mood normal.         Behavior: Behavior is cooperative.      Comments: Mood has greatly improved. Hugging staff  Social talkative  Appreciative of care          Assessment/Plan  Problem List Items Addressed This Visit       Weakness      Requires therapy S/P amputation  Requires assistance with HOC         Status post  amputation of arm above elbow, left - Primary     Stump is covered with surgical dressing  Has follow up appointment  with surgeon   The exposed upper arm is warm with good coloration  No edema  Oxycodone prn pain  Experiencing phantom pain            Phantom pain following amputation of upper limb (CMS/HCC)     Is experiencing phantom pain sensation  To provide information on effective interventions   Monitor           Medications, treatments, and labs reviewed  Continue medications and treatments as listed in EMR    SIMON Raza      Electronically Signed By: SIMON Raza   10/23/23  6:54 PM

## 2023-10-23 NOTE — ASSESSMENT & PLAN NOTE
Is experiencing phantom pain sensation  To provide information on effective interventions   Monitor

## 2023-10-23 NOTE — ASSESSMENT & PLAN NOTE
Stump is covered with surgical dressing  Has follow up appointment  with surgeon   The exposed upper arm is warm with good coloration  No edema  Oxycodone prn pain  Experiencing phantom pain

## 2023-10-25 ENCOUNTER — NURSING HOME VISIT (OUTPATIENT)
Dept: POST ACUTE CARE | Facility: EXTERNAL LOCATION | Age: 58
End: 2023-10-25
Payer: MEDICAID

## 2023-10-25 DIAGNOSIS — S82.201D CLOSED FRACTURE OF RIGHT TIBIA AND FIBULA WITH ROUTINE HEALING, SUBSEQUENT ENCOUNTER: ICD-10-CM

## 2023-10-25 DIAGNOSIS — G47.33 OBSTRUCTIVE SLEEP APNEA: ICD-10-CM

## 2023-10-25 DIAGNOSIS — S82.401D CLOSED FRACTURE OF RIGHT TIBIA AND FIBULA WITH ROUTINE HEALING, SUBSEQUENT ENCOUNTER: ICD-10-CM

## 2023-10-25 DIAGNOSIS — Z91.199 NON-COMPLIANT PATIENT: ICD-10-CM

## 2023-10-25 DIAGNOSIS — R53.1 WEAKNESS: ICD-10-CM

## 2023-10-25 DIAGNOSIS — M86.00 ACUTE HEMATOGENOUS OSTEOMYELITIS, UNSPECIFIED SITE (MULTI): ICD-10-CM

## 2023-10-25 DIAGNOSIS — I48.91 ATRIAL FIBRILLATION, UNSPECIFIED TYPE (MULTI): Primary | ICD-10-CM

## 2023-10-25 DIAGNOSIS — R42 VERTIGO: ICD-10-CM

## 2023-10-25 DIAGNOSIS — G54.6: ICD-10-CM

## 2023-10-25 PROCEDURE — 99305 1ST NF CARE MODERATE MDM 35: CPT | Performed by: INTERNAL MEDICINE

## 2023-10-25 NOTE — PROGRESS NOTES
HISTORY & PHYSICAL    Subjective   Chief complaint: John Rojas is a 58 y.o. male who is a acute skilled care patient being seen and evaluated for multiple medical problems.  Patient presents for weakness.    HPI:  Patient is a 58 year old male with a past medical history of weakness, malaise, reduced mobility, and COPD. Patient was admitted to the skilled nursing facility after being in the hospital. Patient presented to the ED after a fall from a wheelchair. Patient was found to have a fracture of shaft of right tibia. Patient was evaluated by PT/OT and discharged to the skilled nursing facility.         Past Medical History:   Diagnosis Date    COPD (chronic obstructive pulmonary disease) (CMS/[facility])     Depression     Fracture of shaft of right tibia     Lack of coordination     Obstructive sleep apnea (adult) (pediatric)     Obstructive sleep apnea    Pain in unspecified hip     Joint pain, hip    Pain in unspecified knee     Joint pain, knee       Past Surgical History:   Procedure Laterality Date    CT HEAD ANGIO W AND WO IV CONTRAST  8/22/2020    CT HEAD ANGIO W AND WO IV CONTRAST 8/22/2020 [facility] CLINICAL LEGACY    CT NECK ANGIO W AND WO IV CONTRAST  8/22/2020    CT NECK ANGIO W AND WO IV CONTRAST 8/22/2020 [facility] CLINICAL LEGACY    TOTAL HIP ARTHROPLASTY  10/18/2013    Total Hip Replacement       Family History   Problem Relation Name Age of Onset    No Known Problems Mother      No Known Problems Father         Social History     Socioeconomic History    Marital status:      Spouse name: Not on file    Number of children: Not on file    Years of education: Not on file    Highest education level: Not on file   Occupational History    Not on file   Tobacco Use    Smoking status: Not on file    Smokeless tobacco: Not on file   Substance and Sexual Activity    Alcohol use: Not on file    Drug use: Not on file    Sexual activity: Not on file   Other Topics Concern    Not on file   Social History  Narrative    Not on file     Social Determinants of Health     Financial Resource Strain: Not on file   Food Insecurity: Not on file   Transportation Needs: Not on file   Physical Activity: Not on file   Stress: Not on file   Social Connections: Not on file   Intimate Partner Violence: Not on file   Housing Stability: Not on file       Vital signs: 128/70, 98.2, 16, 75, 96%    Objective   Physical Exam  Vitals reviewed.   Constitutional:       Appearance: Normal appearance.   HENT:      Head: Normocephalic and atraumatic.   Cardiovascular:      Rate and Rhythm: Normal rate and regular rhythm.   Pulmonary:      Effort: Pulmonary effort is normal.      Breath sounds: Normal breath sounds.   Abdominal:      General: Bowel sounds are normal.      Palpations: Abdomen is soft.   Musculoskeletal:      Cervical back: Neck supple.   Skin:     General: Skin is warm and dry.   Neurological:      General: No focal deficit present.      Mental Status: He is alert.   Psychiatric:         Mood and Affect: Mood normal.         Behavior: Behavior is cooperative.         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    Provider Attestation - Scribe documentation  All medical record entries made by the Scribe were at my direction and personally dictated by me. I have reviewed the chart and agree that the record accurately reflects my personal performance of the history, physical exam, discussion and  plan.    Lilia Steve MD

## 2023-10-25 NOTE — LETTER
Patient: John Rojas  : 1965    Encounter Date: 10/25/2023    HISTORY & PHYSICAL    Subjective  Chief complaint: John Rojas is a 58 y.o. male who is a acute skilled care patient being seen and evaluated for multiple medical problems.  Patient presents for weakness.    HPI:  Patient is a 58 year old male with a past medical history of weakness, malaise, reduced mobility, and COPD. Patient was admitted to the skilled nursing facility after being in the hospital. Patient presented to the ED after a fall from a wheelchair. Patient was found to have a fracture of shaft of right tibia. Patient was evaluated by PT/OT and discharged to the skilled nursing facility.         Past Medical History:   Diagnosis Date   • COPD (chronic obstructive pulmonary disease) (CMS/[facility])    • Depression    • Fracture of shaft of right tibia    • Lack of coordination    • Obstructive sleep apnea (adult) (pediatric)     Obstructive sleep apnea   • Pain in unspecified hip     Joint pain, hip   • Pain in unspecified knee     Joint pain, knee       Past Surgical History:   Procedure Laterality Date   • CT HEAD ANGIO W AND WO IV CONTRAST  2020    CT HEAD ANGIO W AND WO IV CONTRAST 2020 [facility] CLINICAL LEGACY   • CT NECK ANGIO W AND WO IV CONTRAST  2020    CT NECK ANGIO W AND WO IV CONTRAST 2020 [facility] CLINICAL LEGACY   • TOTAL HIP ARTHROPLASTY  10/18/2013    Total Hip Replacement       Family History   Problem Relation Name Age of Onset   • No Known Problems Mother     • No Known Problems Father         Social History     Socioeconomic History   • Marital status:      Spouse name: Not on file   • Number of children: Not on file   • Years of education: Not on file   • Highest education level: Not on file   Occupational History   • Not on file   Tobacco Use   • Smoking status: Not on file   • Smokeless tobacco: Not on file   Substance and Sexual Activity   • Alcohol use: Not on file   • Drug use: Not on file    • Sexual activity: Not on file   Other Topics Concern   • Not on file   Social History Narrative   • Not on file     Social Determinants of Health     Financial Resource Strain: Not on file   Food Insecurity: Not on file   Transportation Needs: Not on file   Physical Activity: Not on file   Stress: Not on file   Social Connections: Not on file   Intimate Partner Violence: Not on file   Housing Stability: Not on file       Vital signs: 128/70, 98.2, 16, 75, 96%    Objective  Physical Exam  Vitals reviewed.   Constitutional:       Appearance: Normal appearance.   HENT:      Head: Normocephalic and atraumatic.   Cardiovascular:      Rate and Rhythm: Normal rate and regular rhythm.   Pulmonary:      Effort: Pulmonary effort is normal.      Breath sounds: Normal breath sounds.   Abdominal:      General: Bowel sounds are normal.      Palpations: Abdomen is soft.   Musculoskeletal:      Cervical back: Neck supple.   Skin:     General: Skin is warm and dry.   Neurological:      General: No focal deficit present.      Mental Status: He is alert.   Psychiatric:         Mood and Affect: Mood normal.         Behavior: Behavior is cooperative.         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    Provider Attestation - Scribe documentation  All medical record entries made by the Scribe were at my direction and personally dictated by me. I have reviewed the chart and agree that the record accurately  reflects my personal performance of the history, physical exam, discussion and plan.    Lilia Steve MD          Electronically Signed By: Lilia Steve MD   10/25/23  7:18 PM

## 2024-02-02 NOTE — PROGRESS NOTES
PROGRESS NOTE    Subjective   Chief complaint: John Rojas is a 57 y.o. male who is an acute skilled patient being seen and evaluated for weakness    HPI:  Patient working in therapy due to weakness and debility. Requires assistance for transfers, ADL's and mobility. Pain from recent fracture controlled. No acute distress.       Objective   Vital signs: 126/78, 98%    Physical Exam  Constitutional:       General: He is not in acute distress.  Eyes:      Extraocular Movements: Extraocular movements intact.   Cardiovascular:      Rate and Rhythm: Normal rate and regular rhythm.   Pulmonary:      Effort: Pulmonary effort is normal.      Breath sounds: Normal breath sounds.   Abdominal:      General: Bowel sounds are normal.      Palpations: Abdomen is soft.   Musculoskeletal:      Cervical back: Neck supple.      Right lower leg: No edema.      Left lower leg: No edema.   Neurological:      Mental Status: He is alert.   Psychiatric:         Mood and Affect: Mood normal.         Behavior: Behavior is cooperative.         Assessment/Plan   Problem List Items Addressed This Visit       Closed fracture of right fibula and tibia     Stabilizer removed  Continue therapy           Weakness - Primary     Cont therapy        Medications, treatments, and labs reviewed  Continue medications and treatments as listed in [facility]    Scribe Attestation  By signing my name below, IRenata Scribe   attest that this documentation has been prepared under the direction and in the presence of Lilia Steve MD.    Provider Attestation - Scribe documentation  All medical record entries made by the Scribe were at my direction and personally dictated by me. I have reviewed the chart and agree that the record accurately reflects my personal performance of the history, physical exam, discussion and plan.  1. Weakness        2. Closed fracture of right tibia and fibula with routine healing, subsequent encounter             Spoke to pt. Pt verbally agreed and confirmed date and time given. Pt thanked me.     ----- Message from Kayleigh Carlos sent at 2/2/2024  9:24 AM CST -----  Regarding: return call  Contact: @ [phone]  Pt is returning a missed call from someone in the office ... in regards to not sure ..Please call and adv @ [phone]